# Patient Record
Sex: MALE | Race: WHITE | NOT HISPANIC OR LATINO | URBAN - METROPOLITAN AREA
[De-identification: names, ages, dates, MRNs, and addresses within clinical notes are randomized per-mention and may not be internally consistent; named-entity substitution may affect disease eponyms.]

---

## 2018-09-05 ENCOUNTER — INPATIENT (INPATIENT)
Facility: HOSPITAL | Age: 73
LOS: 4 days | Discharge: HOME | End: 2018-09-10
Attending: INTERNAL MEDICINE | Admitting: INTERNAL MEDICINE
Payer: MEDICARE

## 2018-09-05 VITALS
OXYGEN SATURATION: 100 % | SYSTOLIC BLOOD PRESSURE: 117 MMHG | TEMPERATURE: 98 F | DIASTOLIC BLOOD PRESSURE: 59 MMHG | HEART RATE: 99 BPM | WEIGHT: 192.02 LBS

## 2018-09-05 LAB
ABO RH CONFIRMATION: SIGNIFICANT CHANGE UP
ALBUMIN SERPL ELPH-MCNC: 3.6 G/DL — SIGNIFICANT CHANGE UP (ref 3.5–5.2)
ALP SERPL-CCNC: 24 U/L — LOW (ref 30–115)
ALT FLD-CCNC: 10 U/L — SIGNIFICANT CHANGE UP (ref 0–41)
ANION GAP SERPL CALC-SCNC: 14 MMOL/L — SIGNIFICANT CHANGE UP (ref 7–14)
APTT BLD: 29.1 SEC — SIGNIFICANT CHANGE UP (ref 27–39.2)
AST SERPL-CCNC: 13 U/L — SIGNIFICANT CHANGE UP (ref 0–41)
BASE EXCESS BLDV CALC-SCNC: 2.8 MMOL/L — HIGH (ref -2–2)
BASOPHILS # BLD AUTO: 0.06 K/UL — SIGNIFICANT CHANGE UP (ref 0–0.2)
BASOPHILS NFR BLD AUTO: 0.7 % — SIGNIFICANT CHANGE UP (ref 0–1)
BILIRUB SERPL-MCNC: 0.3 MG/DL — SIGNIFICANT CHANGE UP (ref 0.2–1.2)
BLD GP AB SCN SERPL QL: SIGNIFICANT CHANGE UP
BUN SERPL-MCNC: 15 MG/DL — SIGNIFICANT CHANGE UP (ref 10–20)
CA-I SERPL-SCNC: 1.15 MMOL/L — SIGNIFICANT CHANGE UP (ref 1.12–1.3)
CALCIUM SERPL-MCNC: 8.4 MG/DL — LOW (ref 8.5–10.1)
CHLORIDE SERPL-SCNC: 101 MMOL/L — SIGNIFICANT CHANGE UP (ref 98–110)
CO2 SERPL-SCNC: 24 MMOL/L — SIGNIFICANT CHANGE UP (ref 17–32)
CREAT SERPL-MCNC: 1.1 MG/DL — SIGNIFICANT CHANGE UP (ref 0.7–1.5)
EOSINOPHIL # BLD AUTO: 0.22 K/UL — SIGNIFICANT CHANGE UP (ref 0–0.7)
EOSINOPHIL NFR BLD AUTO: 2.7 % — SIGNIFICANT CHANGE UP (ref 0–8)
GAS PNL BLDV: 140 MMOL/L — SIGNIFICANT CHANGE UP (ref 136–145)
GAS PNL BLDV: SIGNIFICANT CHANGE UP
GLUCOSE SERPL-MCNC: 88 MG/DL — SIGNIFICANT CHANGE UP (ref 70–99)
HCO3 BLDV-SCNC: 28 MMOL/L — SIGNIFICANT CHANGE UP (ref 22–29)
HCT VFR BLD CALC: 19.5 % — LOW (ref 42–52)
HCT VFR BLDA CALC: 19.9 % — LOW (ref 34–44)
HGB BLD CALC-MCNC: 6.5 G/DL — LOW (ref 14–18)
HGB BLD-MCNC: 6 G/DL — CRITICAL LOW (ref 14–18)
IMM GRANULOCYTES NFR BLD AUTO: 0.2 % — SIGNIFICANT CHANGE UP (ref 0.1–0.3)
INR BLD: 1.53 RATIO — HIGH (ref 0.65–1.3)
LACTATE BLDV-MCNC: 1.2 MMOL/L — SIGNIFICANT CHANGE UP (ref 0.5–1.6)
LYMPHOCYTES # BLD AUTO: 3 K/UL — SIGNIFICANT CHANGE UP (ref 1.2–3.4)
LYMPHOCYTES # BLD AUTO: 36.8 % — SIGNIFICANT CHANGE UP (ref 20.5–51.1)
MCHC RBC-ENTMCNC: 29.6 PG — SIGNIFICANT CHANGE UP (ref 27–31)
MCHC RBC-ENTMCNC: 30.8 G/DL — LOW (ref 32–37)
MCV RBC AUTO: 96.1 FL — HIGH (ref 80–94)
MONOCYTES # BLD AUTO: 0.68 K/UL — HIGH (ref 0.1–0.6)
MONOCYTES NFR BLD AUTO: 8.3 % — SIGNIFICANT CHANGE UP (ref 1.7–9.3)
NEUTROPHILS # BLD AUTO: 4.18 K/UL — SIGNIFICANT CHANGE UP (ref 1.4–6.5)
NEUTROPHILS NFR BLD AUTO: 51.3 % — SIGNIFICANT CHANGE UP (ref 42.2–75.2)
NRBC # BLD: 0 /100 WBCS — SIGNIFICANT CHANGE UP (ref 0–0)
PCO2 BLDV: 46 MMHG — SIGNIFICANT CHANGE UP (ref 41–51)
PH BLDV: 7.39 — SIGNIFICANT CHANGE UP (ref 7.26–7.43)
PLATELET # BLD AUTO: 243 K/UL — SIGNIFICANT CHANGE UP (ref 130–400)
PO2 BLDV: 12 MMHG — LOW (ref 20–40)
POTASSIUM BLDV-SCNC: 3.4 MMOL/L — SIGNIFICANT CHANGE UP (ref 3.3–5.6)
POTASSIUM SERPL-MCNC: 3.8 MMOL/L — SIGNIFICANT CHANGE UP (ref 3.5–5)
POTASSIUM SERPL-SCNC: 3.8 MMOL/L — SIGNIFICANT CHANGE UP (ref 3.5–5)
PROT SERPL-MCNC: 5.6 G/DL — LOW (ref 6–8)
PROTHROM AB SERPL-ACNC: 16.5 SEC — HIGH (ref 9.95–12.87)
RBC # BLD: 2.03 M/UL — LOW (ref 4.7–6.1)
RBC # FLD: 14.7 % — HIGH (ref 11.5–14.5)
SAO2 % BLDV: 14 % — SIGNIFICANT CHANGE UP
SODIUM SERPL-SCNC: 139 MMOL/L — SIGNIFICANT CHANGE UP (ref 135–146)
TROPONIN T SERPL-MCNC: <0.01 NG/ML — SIGNIFICANT CHANGE UP
TYPE + AB SCN PNL BLD: SIGNIFICANT CHANGE UP
WBC # BLD: 8.16 K/UL — SIGNIFICANT CHANGE UP (ref 4.8–10.8)
WBC # FLD AUTO: 8.16 K/UL — SIGNIFICANT CHANGE UP (ref 4.8–10.8)

## 2018-09-05 RX ORDER — ACETAMINOPHEN 500 MG
650 TABLET ORAL ONCE
Qty: 0 | Refills: 0 | Status: COMPLETED | OUTPATIENT
Start: 2018-09-05 | End: 2018-09-05

## 2018-09-05 RX ORDER — PANTOPRAZOLE SODIUM 20 MG/1
80 TABLET, DELAYED RELEASE ORAL ONCE
Qty: 0 | Refills: 0 | Status: COMPLETED | OUTPATIENT
Start: 2018-09-05 | End: 2018-09-05

## 2018-09-05 RX ORDER — PANTOPRAZOLE SODIUM 20 MG/1
8 TABLET, DELAYED RELEASE ORAL
Qty: 80 | Refills: 0 | Status: DISCONTINUED | OUTPATIENT
Start: 2018-09-05 | End: 2018-09-07

## 2018-09-05 RX ADMIN — Medication 650 MILLIGRAM(S): at 19:47

## 2018-09-05 NOTE — ED ADULT NURSE NOTE - OBJECTIVE STATEMENT
Pt 74 y/o male from home c/o SOB, dizziness, pallor, and very sleepy  x few weeks. Pt newly started on xarelto. As per pt's daughter pt's skin color is not his normal skin color, and as per pt states his stools are black like.

## 2018-09-05 NOTE — ED ADULT NURSE NOTE - NSIMPLEMENTINTERV_GEN_ALL_ED
Implemented All Universal Safety Interventions:  [place] to call system. Call bell, personal items and telephone within reach. Instruct patient to call for assistance. Room bathroom lighting operational. Non-slip footwear when patient is off stretcher. Physically safe environment: no spills, clutter or unnecessary equipment. Stretcher in lowest position, wheels locked, appropriate side rails in place.

## 2018-09-05 NOTE — ED ADULT NURSE NOTE - PMH
AAA (abdominal aortic aneurysm) without rupture    CAD (coronary artery disease)    HLD (hyperlipidemia)    MI (myocardial infarction)

## 2018-09-05 NOTE — ED PROVIDER NOTE - CARE PLAN
Principal Discharge DX:	Symptomatic anemia  Secondary Diagnosis:	Shortness of breath  Secondary Diagnosis:	Dizziness

## 2018-09-05 NOTE — ED PROVIDER NOTE - ATTENDING CONTRIBUTION TO CARE
74 y/o male with h/o CAD s/p MI (no cardiac stents), AAA s/p repair with stents, HTN, HLD, LV thrombus diagnosed 6/2018 on ct for abd pain on xarelto in ER with c/o feeling dizzy/lightheaded for the past ~ 5 days with MESSINA.  + near syncope, no LOC. no ha.  no CP.  no abd pain or back pain.  pt notes having 'dark, chocolate brown' stool for the past week, but denies any renetta blood in stool or black stool.  PE - nad, nc/at, eomi, perrl, op - clear, cta b/l, no w/r/r, rrr, abd-soft, nt/nd, nabs, rectal - brown stool as in PA note, from x 4, A&O x 3, no focal neuro deficits.  -ivf, cardiac w/u, ct abd.

## 2018-09-05 NOTE — ED PROVIDER NOTE - NS ED ROS FT
Constitutional: + generalized weakness. no fever, chills  Eyes: no visual changes  Cardiovascular:+ SOB, + near syncope. no chest pain, no palpitations, no peripheral edema  Respiratory: + SOB with exertion. no cough  Gastrointestinal: no nausea, vomiting or diarrhea. no abdominal pain. + dark stool.  s/p AAA repair with stents  :  no urinary sxs, no flank pain.  Musculoskeletal: no fall or trauma. no msk pain or injury. no neck pain, no back pain, no joint pain.    Integumentary: no rash or skin changes. no edema  Neurological: + dizziness. no headache, no visual changes, no UE/LE weakness or paresthesias. no change in mental status. no neck pain or stiffness.

## 2018-09-05 NOTE — ED PROVIDER NOTE - PHYSICAL EXAMINATION
GENERAL:  well appearing, non-toxic male in no acute distress  SKIN: skin warm, pink and dry. MMM. + pallor  NECK: Neck supple. No meningismus, or JVD. Trachea midline  PULM: CTAB. Normal respiratory effort. No respiratory distress. No wheezes, stridor, rales or rhonchi. No retractions  CV: RRR, no M/R/G.   ABD: Soft, non-tender, non-distended.  rectal: brown stool, no melena, no gross blood.   MSK: FROM of all extremities.  No MSK tenderness. No edema, erythema, cyanosis. Distal pulses intact.  NEURO: A+Ox3, no sensory/motor deficits, CN II-XII intact. No speech slurring, pronator drift, facial asymmetry. Normal finger-to-nose  b/l. 5/5 strength throughout. Normal gait. Negative Romberg.  PSYCH: appropriate behavior, cooperative.

## 2018-09-05 NOTE — ED PROVIDER NOTE - MEDICAL DECISION MAKING DETAILS
Pt in ER with c/o MESSINA/near syncope. hgb 6, pt on xarelto, brown stool on rectal exam.  pt being transfused prbc's.  ct abd neg.  case d/w icu fellow, Dr. Nguyen, pt ok for floor admission for now.  vs ok, 18g iv x 2 in place.  pt admitted for further w/u on new anemia. Pt in ER with c/o MESSINA/near syncope. hgb 6, pt on xarelto for LV thrombus, brown stool on rectal exam.  pt being transfused prbc's.  ct abd neg.  case d/w icu fellow, Dr. Nguyen, pt ok for floor admission for now.  vs ok, 18g iv x 2 in place.  pt admitted for further w/u on new anemia.

## 2018-09-05 NOTE — ED PROVIDER NOTE - PROGRESS NOTE DETAILS
labs with hgb 6.0, pt reports no h/o anemia, last had labs checked ~ 5 months ago. pt t&s'd, to be transfused 2 units prbcs. CT abd neg for acute abd pathology.  pt with symptomatic anemia, hgb 6, pt on xarelto, brown stool on rectal exam.  pt being transfused prbc's.  case d/w icu fellow, Dr. Nguyen, pt ok for floor admission for now.  vs ok, 18g iv x 2 in place.  pt admitted for further w/u on new anemia.

## 2018-09-05 NOTE — ED PROVIDER NOTE - OBJECTIVE STATEMENT
72 yo male with h/o MI, AAA s/p stent, HTN, HLD, left ventricular thombos/aneusym 72 yo male with h/o MI, AAA s/p stent, HTN, HLD, left ventricular thrombose/aneurysm presents to the ED c/o exertion SOB x 1-2 weeks. Associated with dizziness, generalized weakness, near syncope episode and dark stools. No h/o anemia or GI bleed. Denies fever, chills, chest pain, abd pain, brbpr, N/V, UE/LE weakness or paresthesias.

## 2018-09-06 DIAGNOSIS — Z98.890 OTHER SPECIFIED POSTPROCEDURAL STATES: Chronic | ICD-10-CM

## 2018-09-06 DIAGNOSIS — Z90.49 ACQUIRED ABSENCE OF OTHER SPECIFIED PARTS OF DIGESTIVE TRACT: Chronic | ICD-10-CM

## 2018-09-06 LAB
ALBUMIN SERPL ELPH-MCNC: 3.9 G/DL — SIGNIFICANT CHANGE UP (ref 3.5–5.2)
ALP SERPL-CCNC: 28 U/L — LOW (ref 30–115)
ALT FLD-CCNC: 11 U/L — SIGNIFICANT CHANGE UP (ref 0–41)
ANION GAP SERPL CALC-SCNC: 15 MMOL/L — HIGH (ref 7–14)
APTT BLD: 28 SEC — SIGNIFICANT CHANGE UP (ref 27–39.2)
APTT BLD: SIGNIFICANT CHANGE UP SEC (ref 27–39.2)
AST SERPL-CCNC: 14 U/L — SIGNIFICANT CHANGE UP (ref 0–41)
BASOPHILS # BLD AUTO: 0.06 K/UL — SIGNIFICANT CHANGE UP (ref 0–0.2)
BASOPHILS NFR BLD AUTO: 0.8 % — SIGNIFICANT CHANGE UP (ref 0–1)
BILIRUB SERPL-MCNC: 0.8 MG/DL — SIGNIFICANT CHANGE UP (ref 0.2–1.2)
BUN SERPL-MCNC: 13 MG/DL — SIGNIFICANT CHANGE UP (ref 10–20)
CALCIUM SERPL-MCNC: 8.5 MG/DL — SIGNIFICANT CHANGE UP (ref 8.5–10.1)
CHLORIDE SERPL-SCNC: 101 MMOL/L — SIGNIFICANT CHANGE UP (ref 98–110)
CK MB CFR SERPL CALC: 2.6 NG/ML — SIGNIFICANT CHANGE UP (ref 0.6–6.3)
CK SERPL-CCNC: 126 U/L — SIGNIFICANT CHANGE UP (ref 0–225)
CO2 SERPL-SCNC: 24 MMOL/L — SIGNIFICANT CHANGE UP (ref 17–32)
CREAT SERPL-MCNC: 1 MG/DL — SIGNIFICANT CHANGE UP (ref 0.7–1.5)
EOSINOPHIL # BLD AUTO: 0.19 K/UL — SIGNIFICANT CHANGE UP (ref 0–0.7)
EOSINOPHIL NFR BLD AUTO: 2.6 % — SIGNIFICANT CHANGE UP (ref 0–8)
GLUCOSE SERPL-MCNC: 102 MG/DL — HIGH (ref 70–99)
HCT VFR BLD CALC: 27.2 % — LOW (ref 42–52)
HCT VFR BLD CALC: 27.5 % — LOW (ref 42–52)
HGB BLD-MCNC: 8.7 G/DL — LOW (ref 14–18)
HGB BLD-MCNC: 8.8 G/DL — LOW (ref 14–18)
IMM GRANULOCYTES NFR BLD AUTO: 0.3 % — SIGNIFICANT CHANGE UP (ref 0.1–0.3)
INR BLD: 1.17 RATIO — SIGNIFICANT CHANGE UP (ref 0.65–1.3)
IRON SATN MFR SERPL: 14 % — LOW (ref 15–50)
IRON SATN MFR SERPL: 45 UG/DL — SIGNIFICANT CHANGE UP (ref 35–150)
LDH SERPL L TO P-CCNC: 194 U/L — SIGNIFICANT CHANGE UP (ref 50–242)
LYMPHOCYTES # BLD AUTO: 1.83 K/UL — SIGNIFICANT CHANGE UP (ref 1.2–3.4)
LYMPHOCYTES # BLD AUTO: 24.8 % — SIGNIFICANT CHANGE UP (ref 20.5–51.1)
MAGNESIUM SERPL-MCNC: 2.2 MG/DL — SIGNIFICANT CHANGE UP (ref 1.8–2.4)
MCHC RBC-ENTMCNC: 29.3 PG — SIGNIFICANT CHANGE UP (ref 27–31)
MCHC RBC-ENTMCNC: 29.3 PG — SIGNIFICANT CHANGE UP (ref 27–31)
MCHC RBC-ENTMCNC: 32 G/DL — SIGNIFICANT CHANGE UP (ref 32–37)
MCHC RBC-ENTMCNC: 32 G/DL — SIGNIFICANT CHANGE UP (ref 32–37)
MCV RBC AUTO: 91.6 FL — SIGNIFICANT CHANGE UP (ref 80–94)
MCV RBC AUTO: 91.7 FL — SIGNIFICANT CHANGE UP (ref 80–94)
MONOCYTES # BLD AUTO: 0.59 K/UL — SIGNIFICANT CHANGE UP (ref 0.1–0.6)
MONOCYTES NFR BLD AUTO: 8 % — SIGNIFICANT CHANGE UP (ref 1.7–9.3)
NEUTROPHILS # BLD AUTO: 4.69 K/UL — SIGNIFICANT CHANGE UP (ref 1.4–6.5)
NEUTROPHILS NFR BLD AUTO: 63.5 % — SIGNIFICANT CHANGE UP (ref 42.2–75.2)
NRBC # BLD: 0 /100 WBCS — SIGNIFICANT CHANGE UP (ref 0–0)
PLATELET # BLD AUTO: 226 K/UL — SIGNIFICANT CHANGE UP (ref 130–400)
PLATELET # BLD AUTO: 228 K/UL — SIGNIFICANT CHANGE UP (ref 130–400)
POTASSIUM SERPL-MCNC: 3.8 MMOL/L — SIGNIFICANT CHANGE UP (ref 3.5–5)
POTASSIUM SERPL-SCNC: 3.8 MMOL/L — SIGNIFICANT CHANGE UP (ref 3.5–5)
PROT SERPL-MCNC: 6.1 G/DL — SIGNIFICANT CHANGE UP (ref 6–8)
PROTHROM AB SERPL-ACNC: 12.6 SEC — SIGNIFICANT CHANGE UP (ref 9.95–12.87)
RBC # BLD: 2.97 M/UL — LOW (ref 4.7–6.1)
RBC # BLD: 3 M/UL — LOW (ref 4.7–6.1)
RBC # BLD: 3 M/UL — LOW (ref 4.7–6.1)
RBC # FLD: 15.1 % — HIGH (ref 11.5–14.5)
RBC # FLD: 15.2 % — HIGH (ref 11.5–14.5)
RETICS #: 142.2 K/UL — HIGH (ref 25–125)
RETICS/RBC NFR: 4.7 % — HIGH (ref 0.5–1.5)
SODIUM SERPL-SCNC: 140 MMOL/L — SIGNIFICANT CHANGE UP (ref 135–146)
TIBC SERPL-MCNC: 326 UG/DL — SIGNIFICANT CHANGE UP (ref 220–430)
TROPONIN T SERPL-MCNC: <0.01 NG/ML — SIGNIFICANT CHANGE UP
UIBC SERPL-MCNC: 281 UG/DL — SIGNIFICANT CHANGE UP (ref 110–370)
WBC # BLD: 10.86 K/UL — HIGH (ref 4.8–10.8)
WBC # BLD: 7.38 K/UL — SIGNIFICANT CHANGE UP (ref 4.8–10.8)
WBC # FLD AUTO: 10.86 K/UL — HIGH (ref 4.8–10.8)
WBC # FLD AUTO: 7.38 K/UL — SIGNIFICANT CHANGE UP (ref 4.8–10.8)

## 2018-09-06 PROCEDURE — 99222 1ST HOSP IP/OBS MODERATE 55: CPT

## 2018-09-06 RX ORDER — ACETAMINOPHEN 500 MG
650 TABLET ORAL EVERY 4 HOURS
Qty: 0 | Refills: 0 | Status: DISCONTINUED | OUTPATIENT
Start: 2018-09-06 | End: 2018-09-10

## 2018-09-06 RX ORDER — HEPARIN SODIUM 5000 [USP'U]/ML
1600 INJECTION INTRAVENOUS; SUBCUTANEOUS
Qty: 25000 | Refills: 0 | Status: DISCONTINUED | OUTPATIENT
Start: 2018-09-06 | End: 2018-09-07

## 2018-09-06 RX ORDER — FUROSEMIDE 40 MG
20 TABLET ORAL DAILY
Qty: 0 | Refills: 0 | Status: DISCONTINUED | OUTPATIENT
Start: 2018-09-06 | End: 2018-09-10

## 2018-09-06 RX ORDER — METOPROLOL TARTRATE 50 MG
100 TABLET ORAL DAILY
Qty: 0 | Refills: 0 | Status: DISCONTINUED | OUTPATIENT
Start: 2018-09-06 | End: 2018-09-10

## 2018-09-06 RX ORDER — HEPARIN SODIUM 5000 [USP'U]/ML
7000 INJECTION INTRAVENOUS; SUBCUTANEOUS ONCE
Qty: 0 | Refills: 0 | Status: COMPLETED | OUTPATIENT
Start: 2018-09-06 | End: 2018-09-06

## 2018-09-06 RX ORDER — ATORVASTATIN CALCIUM 80 MG/1
80 TABLET, FILM COATED ORAL AT BEDTIME
Qty: 0 | Refills: 0 | Status: DISCONTINUED | OUTPATIENT
Start: 2018-09-06 | End: 2018-09-10

## 2018-09-06 RX ORDER — SOD SULF/SODIUM/NAHCO3/KCL/PEG
4000 SOLUTION, RECONSTITUTED, ORAL ORAL ONCE
Qty: 0 | Refills: 0 | Status: COMPLETED | OUTPATIENT
Start: 2018-09-06 | End: 2018-09-06

## 2018-09-06 RX ADMIN — ATORVASTATIN CALCIUM 80 MILLIGRAM(S): 80 TABLET, FILM COATED ORAL at 21:38

## 2018-09-06 RX ADMIN — HEPARIN SODIUM 16 UNIT(S)/HR: 5000 INJECTION INTRAVENOUS; SUBCUTANEOUS at 18:18

## 2018-09-06 RX ADMIN — Medication 4000 MILLILITER(S): at 18:19

## 2018-09-06 RX ADMIN — Medication 20 MILLIGRAM(S): at 06:26

## 2018-09-06 RX ADMIN — Medication 650 MILLIGRAM(S): at 21:30

## 2018-09-06 RX ADMIN — PANTOPRAZOLE SODIUM 10 MG/HR: 20 TABLET, DELAYED RELEASE ORAL at 17:37

## 2018-09-06 RX ADMIN — Medication 15 MILLIGRAM(S): at 21:39

## 2018-09-06 RX ADMIN — Medication 100 MILLIGRAM(S): at 06:26

## 2018-09-06 RX ADMIN — PANTOPRAZOLE SODIUM 10 MG/HR: 20 TABLET, DELAYED RELEASE ORAL at 06:56

## 2018-09-06 RX ADMIN — Medication 10 MILLIGRAM(S): at 18:17

## 2018-09-06 RX ADMIN — HEPARIN SODIUM 7000 UNIT(S): 5000 INJECTION INTRAVENOUS; SUBCUTANEOUS at 18:17

## 2018-09-06 RX ADMIN — PANTOPRAZOLE SODIUM 80 MILLIGRAM(S): 20 TABLET, DELAYED RELEASE ORAL at 03:00

## 2018-09-06 NOTE — H&P ADULT - NSHPSOCIALHISTORY_GEN_ALL_CORE
Marital Status:  ( x  )    (   ) Single    (   )    (  )   Occupation: Lives with: (  ) alone  (  ) children   ( x ) spouse   (  ) parents  (  ) other  Recent Travel: None    Substance Use (street drugs): ( x ) never used  (  ) other:  Tobacco Usage:  (   ) never smoked   (   ) former smoker   ( x  ) current smoker : 1/2 ppd for >50 years   Alcohol Usage: Denied

## 2018-09-06 NOTE — CONSULT NOTE ADULT - ASSESSMENT
73M with pmhx of AAA s/p EVAR 2011 (Dr. Leblanc [place]), CAD/MI, Left ventricular thrombus/aneurysm (on Xarelto), HTN, sciatica, and HLD presents for 4 days of progressive dynspnea on exertion, pre syncope, and dizziness. Patient does admit to weight loss of 3-4 lbs in the last 3-4 months due slight dietary modification. Patient denied melena, hematochezia, hematuria, hematemesis, recent trauma, night sweats/pain, history of cancer, abdominal pain, n/v/d, rashes, or loss of consciousness. Stools have been reported as dark brown, not typically different from his usual bowel movements. No recent illness or abx usage. Patient also denied any history of anemia in the past.   Reported frequent daily NSAID usage for sciatic pain but stopped taking them ~1.5 months ago.   Patients last colo was in 2009 and showed 5mm non-malignant polyp on pathology.   Xarelto was started ~3 weeks ago by  Cardiologist. States he has tolerated it well. Previously was taking plavix.     Last seen by Dr. Leblanc 2 wks ago. No endoleak on CTA    Vascular called for clearance prior endoscopy/colonoscopy    Will d/w Dr. Ewing

## 2018-09-06 NOTE — H&P ADULT - NSHPOUTPATIENTPROVIDERS_GEN_ALL_CORE
PMD: Dr Sequeira  [facility] Sx: Dr. Herrera ([place])  GI: Dr. Che   Cardio: Dr. Urbina     Pharm: [facility] in [place] PMD: Dr Fabby Crews Sx: Dr. Herrera ([place])  GI: Dr. Che   Cardio: Dr. Cruz     Pharm: [facility] in [place]

## 2018-09-06 NOTE — CONSULT NOTE ADULT - ASSESSMENT
A 73M with pmhx of AAA s/p stent, CAD/MI, Left ventricular thrombus/aneurysm (on Xarelto started 3 weeks ago and plavix was stopped atthattime), HTN, sciatica, and HLD presents for 4 days of progressive dyspnea on exertion, pre syncope, and dizziness. Patient reports having dark brown stool last week .He denies any hx of GI bleed in the past, Hematochezia, hematemesis, abdominal pain, dysphagia, odynophagia.Patient does admit to weight loss of 3-4 lbs in the last 3-4 months due slight dietary modification. he also  reported frequent daily NSAID usage for sciatic pain but stopped taking them ~1.5 months ago.   Last Colonoscopy in 2009 by Dr renee narayanan showed polyp and never had an EGD       #Symptomatic anemia/No signs of active GI bleeding( Patient may have upper GI  bleed before then now resolved)  No baseline Hg , pt s/p 2 u PRBC in ED with last hg 8.8 from 6   2 iv 18 G  can have clear liquid, if repeat hg stable can advance diet    Protonix drip   Follow Hg q12h  Icu evaluation   Patient is on xarelto and needs to be off for 2 days before any intervention is done  Please get cardiology clearance to be off ac and whether he needs bridging giving patient had recent LV thrombus  Consider 2 d echo   Patient will need EGD/ colonoscopy  will follow A 73M with pmhx of AAA s/p stent, CAD/MI, Left ventricular thrombus/aneurysm (on Xarelto started 3 weeks ago and plavix was stopped atthattime), HTN, sciatica, and HLD presents for 4 days of progressive dyspnea on exertion, pre syncope, and dizziness. Patient reports having dark brown stool last week .He denies any hx of GI bleed in the past, Hematochezia, hematemesis, abdominal pain, dysphagia, odynophagia.Patient does admit to weight loss of 3-4 lbs in the last 3-4 months due slight dietary modification. he also  reported frequent daily NSAID usage for sciatic pain but stopped taking them ~1.5 months ago.   Last Colonoscopy in 2009 by Dr renee narayanan showed polyp and never had an EGD       #Symptomatic anemia/No signs of active GI bleeding( Patient may have upper GI  bleed before then now resolved)  No baseline Hg , pt s/p 2 u PRBC in ED with last hg 8.8 from 6   2 iv 18 G  can have clear liquid, if repeat hg stable can advance diet    Protonix drip   Follow Hg q12h  Icu evaluation   Patient is on xarelto and needs to be off for 2 days before any intervention is done  Will do  EGD/ colonoscopy tomorrow  Please start heparin drip tonight at 6hpm and stop the drip tomorrow at 4ham   Please give golytely and dulcolax   NPO after midnight   Resident on call informed A 73M with pmhx of AAA s/p stent, CAD/MI, Left ventricular thrombus/aneurysm (on Xarelto started 3 weeks ago and plavix was stopped atthattime), HTN, sciatica, and HLD presents for 4 days of progressive dyspnea on exertion, pre syncope, and dizziness. Patient reports having dark brown stool last week .He denies any hx of GI bleed in the past, Hematochezia, hematemesis, abdominal pain, dysphagia, odynophagia.Patient does admit to weight loss of 3-4 lbs in the last 3-4 months due slight dietary modification. he also  reported frequent daily NSAID usage for sciatic pain but stopped taking them ~1.5 months ago.   Last Colonoscopy in 2009 by Dr renee narayanan showed polyp and never had an EGD       #Symptomatic anemia/No signs of active GI bleeding( Patient may have upper GI  bleed before then now resolved)  No baseline Hg , pt s/p 2 u PRBC in ED with last hg 8.8 from 6   2 iv 18 G  can have clear liquid, if repeat hg stable can advance diet    Protonix drip   Follow Hg q12h  Icu evaluation   Patient is on xarelto and needs to be off for 2 days before any intervention is done  Will do  EGD/ colonoscopy tomorrow  Please start heparin drip tonight at 6hpm and stop the drip tomorrow at 4 am   Please give golytely and dulcolax   NPO after midnight   Resident on call informed

## 2018-09-06 NOTE — H&P ADULT - HISTORY OF PRESENT ILLNESS
73M with pmhx of AAA s/p stent, CAD/MI, Left ventricular thrombus/aneurysm (on Xarelto), HTN, sciatica, and HLD presents for 4 days of progressive dynspnea on exertion, pre syncope, and dizziness. Patient does admit to weight loss of 3-4 lbs in the last 3-4 months due slight dietary modification. Patient denied melena, hematochezia, hematuria, hematemesis, recent trauma, night sweats/pain, history of cancer, abdominal pain, n/v/d, rashes, or loss of consciousness. Stools have been reported as dark brown, not typically different from his usual bowel movements. No recent illness or abx usage. Patient also denied any history of anemia in the past.   Reported frequent daily NSAID usage for sciatic pain but stopped taking them ~1.5 months ago.   Patients last colo was in 2009 and showed 5mm non-malignant polyp on pathology.   Xarelto was started ~3 weeks ago by  Cardiologist. States he has tolerated it well. Previously was taking plavix.       In ED: T: 99   HR: 99    BP: 117/59   Hb was found to be 6 (last CBC in 2009 was Hb 14.9), transfused 2 u prbc. Stool was found to be dark brown on exam in ED (no guaiac done)

## 2018-09-06 NOTE — H&P ADULT - NSHPPHYSICALEXAM_GEN_ALL_CORE
Vital Signs Last 24 Hrs  T(C): 36.4 (05 Sep 2018 21:50), Max: 36.7 (05 Sep 2018 14:00)  T(F): 97.6 (05 Sep 2018 21:50), Max: 98 (05 Sep 2018 14:00)  HR: 71 (05 Sep 2018 21:50) (71 - 99)  BP: 97/58 (05 Sep 2018 21:50) (92/56 - 120/70  RR: 18 (05 Sep 2018 21:50) (18 - 18)  SpO2: 100% (05 Sep 2018 21:50) (100% - 100%)      GENERAL: NAD, well-developed, Non-toxic, stated age   HEAD:  Atraumatic, Normocephalic  EYES: EOMI, conjunctiva and sclera clear  NECK: Supple, No JVD  CHEST/LUNG: Clear to auscultation bilaterally; No wheezing, rhonchi, or crackles  HEART: Regular rate and rhythm; s1, s2, No murmurs, rubs, or gallops  ABDOMEN: Soft, Nontender, Nondistended; Bowel sounds present, No rebound or guarding noted   EXTREMITIES:  No lower extremity edema or calf tenderness to palpation.  No clubbing or cyanosis  PSYCH: AAOx3  NEUROLOGY: non-focal  SKIN: No rashes or lesions

## 2018-09-06 NOTE — CONSULT NOTE ADULT - SUBJECTIVE AND OBJECTIVE BOX
We were asked to see the patient for symptomatic anemia   GI HPI:  A 73M with pmhx of AAA s/p stent, CAD/MI, Left ventricular thrombus/aneurysm (on Xarelto started 3 weeks ago and plavix was stopped atthattime), HTN, sciatica, and HLD presents for 4 days of progressive dyspnea on exertion, pre syncope, and dizziness. Patient reports having dark brown stool last week .He denies any hx of GI bleed in the past, Hematochezia, hematemesis, abdominal pain, dysphagia, odynophagia.Patient does admit to weight loss of 3-4 lbs in the last 3-4 months due slight dietary modification. he also  reported frequent daily NSAID usage for sciatic pain but stopped taking them ~1.5 months ago.   Last Colonoscopy in 2009 by Dr renee narayanan showed polyp and never had an EGD       In ED: T: 99   HR: 99    BP: 117/59   Hb was found to be 6 (last CBC in 2009 was Hb 14.9), transfused 2 u prbc. Stool was found to be dark brown on exam in ED (no guaiac done) (06 Sep 2018 01:41)      PAST MEDICAL & SURGICAL HISTORY  Sciatica  HLD (hyperlipidemia)  CAD (coronary artery disease)  AAA (abdominal aortic aneurysm) without rupture  MI (myocardial infarction)  History of appendectomy  History of abdominal aortic aneurysm (AAA) repair      FAMILY HISTORY:  FAMILY HISTORY:  Family history of oral cancer (Father)      SOCIAL HISTORY:  smoker: + smoker  Alcohol: Non alcoholic  Drug: Denies use of drugs     ALLERGIES:  No Known Allergies      MEDICATIONS:  MEDICATIONS  (STANDING):  atorvastatin 80 milliGRAM(s) Oral at bedtime  furosemide    Tablet 20 milliGRAM(s) Oral daily  metoprolol succinate  milliGRAM(s) Oral daily  pantoprazole Infusion 8 mG/Hr (10 mL/Hr) IV Continuous <Continuous>    MEDICATIONS  (PRN):  acetaminophen   Tablet .. 650 milliGRAM(s) Oral every 4 hours PRN Temp greater or equal to 38C (100.4F), Mild Pain (1 - 3)      HOME MEDICATIONS:  Home Medications:  Crestor 40 mg oral tablet: 1 tab(s) orally once a day (at bedtime) (06 Sep 2018 01:53)  famotidine 20 mg oral tablet: 1 tab(s) orally (06 Sep 2018 01:53)  furosemide 20 mg oral tablet: 1 tab(s) orally once a day (06 Sep 2018 01:53)  metoprolol: 120 milligram(s) orally (06 Sep 2018 01:53)  Xarelto 20 mg oral tablet: 1 tab(s) orally once a day (in the evening) (06 Sep 2018 01:53)      ROS:     REVIEW OF SYSTEMS  General:  + weight loss, no fevers, or chills.  Eyes:  No reported pain or visual changes  ENT:  No sore throat or runny nose.  NECK: No stiffness or lymphadenopathy  CV:  No chest pain or palpitations.  GI:  See HPI  :  No dysuria, urinary incontinence or hematuria.  Muscle:  No aches or weakness  Neuro:  No tingling, numbness or vertigo  Psych:  No mood problems or irritability.  Endocrine:  No polyuria, polydipsia or cold/heat intolerance  Heme:  No ecchymosis or easy bruisability  All other review of systems is negative unless indicated above.         VITALS:   T(F): 96.8 (09-06 @ 07:38), Max: 98 (09-05 @ 14:00)  HR: 74 (09-06 @ 07:38) (70 - 99)  BP: 128/63 (09-06 @ 07:38) (92/56 - 128/63)  BP(mean): --  RR: 20 (09-06 @ 07:38) (18 - 20)  SpO2: 100% (09-06 @ 07:38) (100% - 100%)    I&O's Summary      PHYSICAL EXAM:  GENERAL:  Appears stated age,  no distress  HEENT:  Conjunctivae anicteric  CHEST:  Full & symmetric excursion, no increased effort, breath sounds clear  HEART:  Regular rhythm, S1, S2,   ABDOMEN:  Soft, non-tender, non-distended, normoactive bowel sounds,  no masses ,  EXTEREMITIES:  no  edema  SKIN:  No rash  NEURO:  Alert, oriented, , no tremor,         LABS:                        8.8    7.38  )-----------( 228      ( 06 Sep 2018 09:36 )             27.5     PT/INR - ( 05 Sep 2018 17:14 )  INR: 1.53          PTT - ( 05 Sep 2018 17:14 )  PTT:29.1   LIVER FUNCTIONS - ( 05 Sep 2018 17:14 )  Alb: 3.6 g/dL / Pro: 5.6 g/dL / ALK PHOS: 24 U/L / ALT: 10 U/L / AST: 13 U/L / GGT: x           09-05    139  |  101  |  15  ----------------------------<  88  3.8   |  24  |  1.1    Ca    8.4<L>      05 Sep 2018 17:14      CARDIAC MARKERS ( 05 Sep 2018 17:14 )  x     / <0.01 ng/mL / x     / x     / x            RADIOLOGY:    < from: CT Abdomen and Pelvis w/ IV Cont (09.05.18 @ 21:28) >  NTERPRETATION:  CLINICAL STATEMENT: Abdominal pain.      TECHNIQUE: Contiguous axial CT images were obtained from the lower chest   to the pubic symphysis following administration of 100cc Optiray 320   intravenous contrast.  Oral contrast was not administered.  Reformatted   images in the coronal and sagittal planes were acquired.    COMPARISON CT: None.    OTHER STUDIES USED FOR CORRELATION: None.       FINDINGS:    LOWER CHEST: Unremarkable.    HEPATOBILIARY: Unremarkable.    SPLEEN: Unremarkable.    PANCREAS: Unremarkable.    ADRENAL GLANDS: Unremarkable.    KIDNEYS: Right renal scarring. Right renal cyst. No hydronephrosis    ABDOMINOPELVIC NODES: Unremarkable.    PELVIC ORGANS: Unremarkable.    PERITONEUM/MESENTERY/BOWEL: Unremarkable.    BONES/SOFT TISSUES: Degenerative changes spine.    OTHER: Infrarenal abdominal 4 cm aortic aneurysm, status post   aortobiiliac stent with SMA and bilateral renal artery stenting. Small   right fat-containing inguinal hernia.      IMPRESSION:   1. No evidence of acute intra-abdominal pathology.  2. Infrarenal abdominal 4 cm aortic aneurysm, status post aortobiiliac   stent with SMA and bilateral renal artery stenting.                    < end of copied text >  < from: Xray Chest 1 View- PORTABLE-Urgent (09.05.18 @ 18:53) >      PROCEDURE DATE:  09/05/2018            INTERPRETATION:  Clinical History / Reason for exam: Shortness of breath    Comparison : Chest radiograph December 17, 2009.    Technique/Positioning: Frontal, portable.    Findings:    Support devices: None.    Cardiac/mediastinum/hilum: Heart is prominent. Aorta is calcified.    Lung parenchyma/Pleura: Within normal limits.    Skeleton/soft tissues: Degenerative change    Impression:      No radiographic evidence of acute cardiopulmonary disease.                < end of copied text >

## 2018-09-06 NOTE — CONSULT NOTE ADULT - SUBJECTIVE AND OBJECTIVE BOX
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 Patient does admit to weight loss of 3-4 lbs in the last 3-4 months due slight dietary modification. Patient denied melena, hematochezia, hematuria, hematemesis, recent trauma, night sweats/pain, history of cancer, abdominal pain, n/v/d, rashes, or loss of consciousness. Stools have been reported as dark brown, not typically different from his usual bowel movements. No recent illness or abx usage. Patient also denied any history of anemia in the past.   Reported frequent daily NSAID usage for sciatic pain but stopped taking them ~1.5 months ago.   Patients last colo was in 2009 and showed 5mm non-malignant polyp on pathology.   Xarelto was started ~3 weeks ago by  Cardiologist. States he has tolerated it well. Previously was taking plavix.       In ED: T: 99   HR: 99    BP: 117/59   Hb was found to be 6 (last CBC in 2009 was Hb 14.9), transfused 2 u prbc. Stool was found to be dark brown on exam in ED (no guaiac done) (06 Sep 2018 01:41)      PAST MEDICAL & SURGICAL HISTORY:  Sciatica  HLD (hyperlipidemia)  CAD (coronary artery disease)  AAA (abdominal aortic aneurysm) without rupture  MI (myocardial infarction)  History of appendectomy  History of abdominal aortic aneurysm (AAA) repair      SOCIAL HX:   Smoking                         ETOH                            Other    FAMILY HISTORY:  Family history of oral cancer (Father)  :  No known cardiovacular family hisotry     ROS:  See HPI     Allergies    No Known Allergies    Intolerances          PHYSICAL EXAM    ICU Vital Signs Last 24 Hrs  T(C): 36.1 (06 Sep 2018 06:38), Max: 36.7 (05 Sep 2018 14:00)  T(F): 97 (06 Sep 2018 06:38), Max: 98 (05 Sep 2018 14:00)  HR: 70 (06 Sep 2018 06:38) (70 - 99)  BP: 109/56 (06 Sep 2018 06:38) (92/56 - 120/70)  BP(mean): --  ABP: --  ABP(mean): --  RR: 20 (06 Sep 2018 06:38) (18 - 20)  SpO2: 100% (05 Sep 2018 21:50) (100% - 100%)      General: In NAD   HEENT:  JASIEL              Lymphatic system: No cervical LN   Lungs: Bilateral BS  Cardiovascular: Regular  Gastrointestinal: Soft, Positive BS  Musculoskeletal: No clubbing.  Moves all extremities.  Full range of motion   Skin: Warm.  Intact  Neurological: No motor or sensory deficit         LABS:                          6.0    8.16  )-----------( 243      ( 05 Sep 2018 17:14 )             19.5                                               09-05    139  |  101  |  15  ----------------------------<  88  3.8   |  24  |  1.1    Ca    8.4<L>      05 Sep 2018 17:14    TPro  5.6<L>  /  Alb  3.6  /  TBili  0.3  /  DBili  x   /  AST  13  /  ALT  10  /  AlkPhos  24<L>  09-05      PT/INR - ( 05 Sep 2018 17:14 )   PT: 16.50 sec;   INR: 1.53 ratio         PTT - ( 05 Sep 2018 17:14 )  PTT:29.1 sec                                           CARDIAC MARKERS ( 05 Sep 2018 17:14 )  x     / <0.01 ng/mL / x     / x     / x                                                LIVER FUNCTIONS - ( 05 Sep 2018 17:14 )  Alb: 3.6 g/dL / Pro: 5.6 g/dL / ALK PHOS: 24 U/L / ALT: 10 U/L / AST: 13 U/L / GGT: x                                                                                             < from: CT Abdomen and Pelvis w/ IV Cont (09.05.18 @ 21:28) >  IMPRESSION:   1. No evidence of acute intra-abdominal pathology.  2. Infrarenal abdominal 4 cm aortic aneurysm, status post aortobiiliac   stent with SMA and bilateral renal artery stenting.    < end of copied text >                                          X-Rays                 unremarkable                                                              MEDICATIONS  (STANDING):  atorvastatin 80 milliGRAM(s) Oral at bedtime  furosemide    Tablet 20 milliGRAM(s) Oral daily  metoprolol succinate  milliGRAM(s) Oral daily  pantoprazole Infusion 8 mG/Hr (10 mL/Hr) IV Continuous <Continuous>      acetaminophen   Tablet .. 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 Patient does admit to weight loss of 3-4 lbs in the last 3-4 months due slight dietary modification. Patient denied melena, hematochezia, hematuria, hematemesis, recent trauma, night sweats/pain, history of cancer, abdominal pain, n/v/d, rashes, or loss of consciousness. Stools have been reported as dark brown, not typically different from his usual bowel movements. No recent illness or abx usage. Patient also denied any history of anemia in the past.   Reported frequent daily NSAID usage for sciatic pain but stopped taking them ~1.5 months ago.   Patients last colo was in 2009 and showed 5mm non-malignant polyp on pathology.   Xarelto was started ~3 weeks ago by  Cardiologist. States he has tolerated it well. Previously was taking plavix.       In ED: T: 99   HR: 99    BP: 117/59   Hb was found to be 6 (last CBC in 2009 was Hb 14.9), transfused 2 u prbc. Stool was found to be dark brown on exam in ED (no guaiac done) (06 Sep 2018 01:41)      PAST MEDICAL & SURGICAL HISTORY:  Sciatica  HLD (hyperlipidemia)  CAD (coronary artery disease)  AAA (abdominal aortic aneurysm) without rupture  MI (myocardial infarction)  History of appendectomy  History of abdominal aortic aneurysm (AAA) repair      SOCIAL HX:   50 pack years ETOH                       FAMILY HISTORY:  Family history of oral cancer (Father)  :  No known cardiovacular family hisotry     ROS:  See HPI     Allergies    No Known Allergies    Intolerances          PHYSICAL EXAM    ICU Vital Signs Last 24 Hrs  T(C): 36.1 (06 Sep 2018 06:38), Max: 36.7 (05 Sep 2018 14:00)  T(F): 97 (06 Sep 2018 06:38), Max: 98 (05 Sep 2018 14:00)  HR: 70 (06 Sep 2018 06:38) (70 - 99)  BP: 109/56 (06 Sep 2018 06:38) (92/56 - 120/70)  BP(mean): --  ABP: --  ABP(mean): --  RR: 20 (06 Sep 2018 06:38) (18 - 20)  SpO2: 100% (05 Sep 2018 21:50) (100% - 100%)      General: In NAD   HEENT:  JASIEL              Lymphatic system: No cervical LN   Lungs: Bilateral BS  Cardiovascular: Regular  Gastrointestinal: Soft, Positive BS  Musculoskeletal: No clubbing.  Moves all extremities.  Full range of motion   Skin: Warm.  Intact  Neurological: No motor or sensory deficit         LABS:                          6.0    8.16  )-----------( 243      ( 05 Sep 2018 17:14 )             19.5                                               09-05    139  |  101  |  15  ----------------------------<  88  3.8   |  24  |  1.1    Ca    8.4<L>      05 Sep 2018 17:14    TPro  5.6<L>  /  Alb  3.6  /  TBili  0.3  /  DBili  x   /  AST  13  /  ALT  10  /  AlkPhos  24<L>  09-05      PT/INR - ( 05 Sep 2018 17:14 )   PT: 16.50 sec;   INR: 1.53 ratio         PTT - ( 05 Sep 2018 17:14 )  PTT:29.1 sec                                           CARDIAC MARKERS ( 05 Sep 2018 17:14 )  x     / <0.01 ng/mL / x     / x     / x                                                LIVER FUNCTIONS - ( 05 Sep 2018 17:14 )  Alb: 3.6 g/dL / Pro: 5.6 g/dL / ALK PHOS: 24 U/L / ALT: 10 U/L / AST: 13 U/L / GGT: x                                                                                             < from: CT Abdomen and Pelvis w/ IV Cont (09.05.18 @ 21:28) >  IMPRESSION:   1. No evidence of acute intra-abdominal pathology.  2. Infrarenal abdominal 4 cm aortic aneurysm, status post aortobiiliac   stent with SMA and bilateral renal artery stenting.    < end of copied text >                                          X-Rays                 unremarkable                                                              MEDICATIONS  (STANDING):  atorvastatin 80 milliGRAM(s) Oral at bedtime  furosemide    Tablet 20 milliGRAM(s) Oral daily  metoprolol succinate  milliGRAM(s) Oral daily  pantoprazole Infusion 8 mG/Hr (10 mL/Hr) IV Continuous <Continuous>      acetaminophen   Tablet .. 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 Patient does admit to weight loss of 3-4 lbs in the last 3-4 months due slight dietary modification. Patient denied melena, hematochezia, hematuria, hematemesis, recent trauma, night sweats/pain, history of cancer, abdominal pain, n/v/d, rashes, or loss of consciousness. Stools have been reported as dark brown, not typically different from his usual bowel movements. No recent illness or abx usage. Patient also denied any history of anemia in the past.   Reported frequent daily NSAID usage for sciatic pain but stopped taking them ~1.5 months ago.   Patients last colo was in 2009 and showed 5mm non-malignant polyp on pathology.   Xarelto was started ~3 weeks ago by  Cardiologist. States he has tolerated it well. Previously was taking plavix.       In ED: T: 99   HR: 99    BP: 117/59   Hb was found to be 6 (last CBC in 2009 was Hb 14.9), transfused 2 u prbc. Stool was found to be dark brown on exam in ED (no guaiac done) (06 Sep 2018 01:41), feels better, no BM in ED      PAST MEDICAL & SURGICAL HISTORY:  Sciatica  HLD (hyperlipidemia)  CAD (coronary artery disease)  AAA (abdominal aortic aneurysm) without rupture  MI (myocardial infarction)  History of appendectomy  History of abdominal aortic aneurysm (AAA) repair      SOCIAL HX:   50 pack years ETOH                       FAMILY HISTORY:  Family history of oral cancer (Father)  :  No known cardiovacular family hisotry     ROS:  See HPI     Allergies    No Known Allergies    Intolerances          PHYSICAL EXAM    ICU Vital Signs Last 24 Hrs  T(C): 36.1 (06 Sep 2018 06:38), Max: 36.7 (05 Sep 2018 14:00)  T(F): 97 (06 Sep 2018 06:38), Max: 98 (05 Sep 2018 14:00)  HR: 70 (06 Sep 2018 06:38) (70 - 99)  BP: 109/56 (06 Sep 2018 06:38) (92/56 - 120/70)  RR: 20 (06 Sep 2018 06:38) (18 - 20)  SpO2: 100% (05 Sep 2018 21:50) (100% - 100%)      General: In NAD   HEENT:  JASIEL              Lymphatic system: No cervical LN   Lungs: Bilateral BS  Cardiovascular: Regular  Gastrointestinal: Soft, Positive BS  Musculoskeletal: No clubbing.  Moves all extremities.  Full range of motion   Skin: Warm.  Intact  Neurological: No motor or sensory deficit         LABS:                          6.0    8.16  )-----------( 243      ( 05 Sep 2018 17:14 )             19.5                                               09-05    139  |  101  |  15  ----------------------------<  88  3.8   |  24  |  1.1    Ca    8.4<L>      05 Sep 2018 17:14    TPro  5.6<L>  /  Alb  3.6  /  TBili  0.3  /  DBili  x   /  AST  13  /  ALT  10  /  AlkPhos  24<L>  09-05      PT/INR - ( 05 Sep 2018 17:14 )   PT: 16.50 sec;   INR: 1.53 ratio         PTT - ( 05 Sep 2018 17:14 )  PTT:29.1 sec                                           CARDIAC MARKERS ( 05 Sep 2018 17:14 )  x     / <0.01 ng/mL / x     / x     / x                                                LIVER FUNCTIONS - ( 05 Sep 2018 17:14 )  Alb: 3.6 g/dL / Pro: 5.6 g/dL / ALK PHOS: 24 U/L / ALT: 10 U/L / AST: 13 U/L / GGT: x                                                                                             < from: CT Abdomen and Pelvis w/ IV Cont (09.05.18 @ 21:28) >  IMPRESSION:   1. No evidence of acute intra-abdominal pathology.  2. Infrarenal abdominal 4 cm aortic aneurysm, status post aortobiiliac   stent with SMA and bilateral renal artery stenting.    < end of copied text >                                          X-Rays                 unremarkable                                                              MEDICATIONS  (STANDING):  atorvastatin 80 milliGRAM(s) Oral at bedtime  furosemide    Tablet 20 milliGRAM(s) Oral daily  metoprolol succinate  milliGRAM(s) Oral daily  pantoprazole Infusion 8 mG/Hr (10 mL/Hr) IV Continuous <Continuous>      acetaminophen   Tablet .. 650 milliGRAM(s) Oral every 4 hours PRN Temp greater or equal to 38C (100.4F), Mild Pain (1 - 3)

## 2018-09-06 NOTE — CONSULT NOTE ADULT - ATTENDING COMMENTS
No contraindication for colonoscopy from vascular stand point. EVAR intact with no endoleak. Patient to f/u with his vascular surgeon.

## 2018-09-06 NOTE — CONSULT NOTE ADULT - ASSESSMENT
IMPRESSION:      PLAN:    CNS:    HEENT: Oral care    PULMONARY:  HOB @ 45 degrees    CARDIOVASCULAR:    GI: GI prophylaxis.  Feeding     RENAL:  Follow up lytes.  Correct as needed    INFECTIOUS DISEASE: Follow up cultures    HEMATOLOGICAL:  DVT prophylaxis.    ENDOCRINE:  Follow up FS.  Insulin protocol if needed.    MUSCULOSKELETAL: IMPRESSION:    Symptomatic Anemia s/p 2 unit pRBC  h/o LV thrombus on xarelto  doubt GI bleed    PLAN:    CNS: no sedation    HEENT: Oral care    PULMONARY:  HOB @ 45 degrees    CARDIOVASCULAR: I=O, f/u echo    GI: c/w with protonix drip, cbc q12h, GI consult, keep npo for now,     RENAL:  Follow up lytes.  Correct as needed    INFECTIOUS DISEASE: Follow up cultures    HEMATOLOGICAL:  continue to hold xarelto, cardio and GI follow up    ENDOCRINE:  Follow up FS.  Insulin protocol if needed.    MUSCULOSKELETAL: OOB to chair    Patient does not need MICU monitoring at this time IMPRESSION:    Symptomatic Anemia s/p 2 unit pRBC  h/o LV thrombus on xarelto  GI BLEED  BETTER WITH TX, CT A/P REVIEWED/ HX OF INFRARENAL ANEURYSM    PLAN:    CNS: AVOID CNS DEPRESSANT    HEENT: Oral care    PULMONARY:  HOB @ 45 degrees, aspiration precaution    CARDIOVASCULAR: I=O, f/u echo    GI: c/w with protonix drip, cbc q12h, GI consult, keep npo for now, VASCULAR F/UP FOR ANEURYSM    RENAL:  Follow up lytes.  Correct as needed    INFECTIOUS DISEASE: Follow up cultures    HEMATOLOGICAL:  continue to hold xarelto, cardio and GI follow up    ENDOCRINE:  Follow up FS.  Insulin protocol if needed.    MUSCULOSKELETAL: OOB to chair    WILL FOLLOW

## 2018-09-06 NOTE — H&P ADULT - ASSESSMENT
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 Transfuse if less than 8 or symptomatic  2 18g ivs in place   Cardio C/s to assess for a/c regarding thrombus and presence of bleed     CAD/MI: Continue with BB and Statin     HTN: Continue home meds     AAA s/p repair: Stable     GI PPx: on ppi ggt  DVT ppx: Sequentials for bleeding   Full Code  NPO for now

## 2018-09-06 NOTE — CONSULT NOTE ADULT - SUBJECTIVE AND OBJECTIVE BOX
VASCULAR SURGERY CONSULT NOTE      HPI:  73M with pmhx of AAA s/p EVAR 2011 (Dr. 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 Patient does admit to weight loss of 3-4 lbs in the last 3-4 months due slight dietary modification. Patient denied melena, hematochezia, hematuria, hematemesis, recent trauma, night sweats/pain, history of cancer, abdominal pain, n/v/d, rashes, or loss of consciousness. Stools have been reported as dark brown, not typically different from his usual bowel movements. No recent illness or abx usage. Patient also denied any history of anemia in the past.   Reported frequent daily NSAID usage for sciatic pain but stopped taking them ~1.5 months ago.   Patients last colo was in 2009 and showed 5mm non-malignant polyp on pathology.   Xarelto was started ~3 weeks ago by  Cardiologist. States he has tolerated it well. Previously was taking plavix.       In ED: T: 99   HR: 99    BP: 117/59   Hb was found to be 6 (last CBC in 2009 was Hb 14.9), transfused 2 u prbc. Stool was found to be dark brown on exam in ED (no guaiac done) (06 Sep 2018 01:41)        PAST MEDICAL & SURGICAL HISTORY:  Sciatica  HLD (hyperlipidemia)  CAD (coronary artery disease)  AAA (abdominal aortic aneurysm) without rupture  MI (myocardial infarction)  History of appendectomy  History of abdominal aortic aneurysm (AAA) repair    No Known Allergies    Home Medications:  Crestor 40 mg oral tablet: 1 tab(s) orally once a day (at bedtime) (06 Sep 2018 01:53)  famotidine 20 mg oral tablet: 1 tab(s) orally (06 Sep 2018 01:53)  furosemide 20 mg oral tablet: 1 tab(s) orally once a day (06 Sep 2018 01:53)  metoprolol: 120 milligram(s) orally (06 Sep 2018 01:53)  Xarelto 20 mg oral tablet: 1 tab(s) orally once a day (in the evening) (06 Sep 2018 01:53)    No permtinent family history of PVD    REVIEW OF SYSTEMS:  GENERAL:                                         negative  SKIN:                                                 negative  OPTHALMOLOGIC:                          negative  ENMT:                                               negative  RESPIRATORY AND THORAX:        negative  CARDIOVASCULAR:                        see HPI  GASTROINTESTINAL:                       see HPI  MUSCULOSKELETAL:                       negative  NEUROLOGIC:                                   negative  PSYCHIATRIC:                                    negative  HEMATOLOGY/LYMPHATICS:         negative  ENDOCRINE:                                     negative  ALLERGIC/IMMUNOLOGIC:            negative    12 point ROS otherwise normal except as stated in HPI    PHYSICAL EXAM  Vital Signs Last 24 Hrs  T(C): 36 (06 Sep 2018 07:38), Max: 36.7 (05 Sep 2018 14:00)  T(F): 96.8 (06 Sep 2018 07:38), Max: 98 (05 Sep 2018 14:00)  HR: 74 (06 Sep 2018 07:38) (70 - 99)  BP: 128/63 (06 Sep 2018 07:38) (92/56 - 128/63)  BP(mean): --  RR: 20 (06 Sep 2018 07:38) (18 - 20)  SpO2: 100% (06 Sep 2018 07:38) (100% - 100%)    Appearance: Normal	  HEENT:   Normal oral mucosa, PERRL, EOMI	  Neck: Supple, - JVD; Carotid Bruit   Cardiovascular: Normal S1 S2, No JVD, No murmurs,   Respiratory: Lungs clear to auscultation, No Rales, Rhonchi, Wheezing	  Gastrointestinal:  Soft, Non-tender, positive BS	  Skin: No rashes, No ecchymoses, No cyanosis  Extremities: Normal range of motion, No clubbing, cyanosis or edema  Vascular: Peripheral pulses palpable 2+ bilaterally  Neurologic: Non-focal  Psychiatry: A & O x 3, Mood & affect appropriate        MEDICATIONS:   MEDICATIONS  (STANDING):  atorvastatin 80 milliGRAM(s) Oral at bedtime  bisacodyl Suppository 10 milliGRAM(s) Rectal once  bisacodyl Suppository 15 milliGRAM(s) Rectal once  furosemide    Tablet 20 milliGRAM(s) Oral daily  heparin  Infusion 1600 Unit(s)/Hr (16 mL/Hr) IV Continuous <Continuous>  heparin  Injectable 7000 Unit(s) IV Push once  metoprolol succinate  milliGRAM(s) Oral daily  pantoprazole Infusion 8 mG/Hr (10 mL/Hr) IV Continuous <Continuous>  polyethylene glycol/electrolyte Solution. 4000 milliLiter(s) Oral once    MEDICATIONS  (PRN):  acetaminophen   Tablet .. 650 milliGRAM(s) Oral every 4 hours PRN Temp greater or equal to 38C (100.4F), Mild Pain (1 - 3)      LAB/STUDIES:                        8.8    7.38  )-----------( 228      ( 06 Sep 2018 09:36 )             27.5     09-06    140  |  101  |  13  ----------------------------<  102<H>  3.8   |  24  |  1.0    Ca    8.5      06 Sep 2018 09:36  Mg     2.2     09-06    TPro  6.1  /  Alb  3.9  /  TBili  0.8  /  DBili  x   /  AST  14  /  ALT  11  /  AlkPhos  28<L>  09-06    PT/INR - ( 06 Sep 2018 09:36 )   PT: 12.60 sec;   INR: 1.17 ratio         PTT - ( 06 Sep 2018 09:36 )  PTT:28.0 sec  LIVER FUNCTIONS - ( 06 Sep 2018 09:36 )  Alb: 3.9 g/dL / Pro: 6.1 g/dL / ALK PHOS: 28 U/L / ALT: 11 U/L / AST: 14 U/L / GGT: x               CARDIAC MARKERS ( 06 Sep 2018 09:36 )  x     / <0.01 ng/mL / 126 U/L / x     / 2.6 ng/mL  CARDIAC MARKERS ( 05 Sep 2018 17:14 )  x     / <0.01 ng/mL / x     / x     / x          IMAGING:    < from: CT Abdomen and Pelvis w/ IV Cont (09.05.18 @ 21:28) >  1. No evidence of acute intra-abdominal pathology.  2. Infrarenal abdominal 4 cm aortic aneurysm, status post aortobiiliac   stent with SMA and bilateral renal artery stenting.    < end of copied text >

## 2018-09-06 NOTE — H&P ADULT - PMH
AAA (abdominal aortic aneurysm) without rupture    CAD (coronary artery disease)    HLD (hyperlipidemia)    MI (myocardial infarction)    Sciatica

## 2018-09-06 NOTE — H&P ADULT - NSHPLABSRESULTS_GEN_ALL_CORE
6.0    8.16  )-----------( 243      ( 05 Sep 2018 17:14 )             19.5       09-05    139  |  101  |  15  ----------------------------<  88  3.8   |  24  |  1.1    Ca    8.4<L>      05 Sep 2018 17:14    TPro  5.6<L>  /  Alb  3.6  /  TBili  0.3  /  DBili  x   /  AST  13  /  ALT  10  /  AlkPhos  24<L>  09-05          PT/INR - ( 05 Sep 2018 17:14 )   PT: 16.50 sec;   INR: 1.53 ratio    PTT - ( 05 Sep 2018 17:14 )  PTT:29.1 sec    CARDIAC MARKERS ( 05 Sep 2018 17:14 )  x     / <0.01 ng/mL / x     / x     / x

## 2018-09-06 NOTE — H&P ADULT - ATTENDING COMMENTS
73 yr old male presented with Hx of  lt ventricular thrombus and aneurysm, presented with dyspnea on exertion.  VITAL SIGNS (Last 24 hrs):  T(C): 36 (09-06-18 @ 07:38), Max: 36.7 (09-05-18 @ 19:43)  HR: 74 (09-06-18 @ 07:38) (70 - 75)  BP: 128/63 (09-06-18 @ 07:38) (92/56 - 128/63)  RR: 20 (09-06-18 @ 07:38) (18 - 20)  SpO2: 100% (09-06-18 @ 07:38) (100% - 100%)  Wt(kg): --  Daily     Daily     I&O's Summary                        8.8    7.38  )-----------( 228      ( 06 Sep 2018 09:36 )             27.5   09-06    140  |  101  |  13  ----------------------------<  102<H>  3.8   |  24  |  1.0    Ca    8.5      06 Sep 2018 09:36  Mg     2.2     09-06    TPro  6.1  /  Alb  3.9  /  TBili  0.8  /  DBili  x   /  AST  14  /  ALT  11  /  AlkPhos  28<L>  09-06  ekg-NSR rate 73/min with LVH and poor R wave progression  o/e  aaox3  chest-b/l air entry  [id number]  abd/gi-soft, bs+  no edema  Assessment and plan:  # Severe anemia s/p 2 units of PRBC and black stool-- seen by GI--plan for egd and colonoscopy in AM  # CAD on plavix changed to xarelto  # LV thrombus on xarelto-- cardilogy eval-- may need clearnce  #Hx oF AAA- s/p repair

## 2018-09-07 ENCOUNTER — RESULT REVIEW (OUTPATIENT)
Age: 73
End: 2018-09-07

## 2018-09-07 LAB
ALBUMIN SERPL ELPH-MCNC: 3.6 G/DL — SIGNIFICANT CHANGE UP (ref 3.5–5.2)
ALP SERPL-CCNC: 26 U/L — LOW (ref 30–115)
ALT FLD-CCNC: 10 U/L — SIGNIFICANT CHANGE UP (ref 0–41)
ANION GAP SERPL CALC-SCNC: 14 MMOL/L — SIGNIFICANT CHANGE UP (ref 7–14)
APTT BLD: 180.5 SEC — CRITICAL HIGH (ref 27–39.2)
APTT BLD: 26.2 SEC — LOW (ref 27–39.2)
AST SERPL-CCNC: 15 U/L — SIGNIFICANT CHANGE UP (ref 0–41)
BASOPHILS # BLD AUTO: 0.04 K/UL — SIGNIFICANT CHANGE UP (ref 0–0.2)
BASOPHILS NFR BLD AUTO: 0.5 % — SIGNIFICANT CHANGE UP (ref 0–1)
BILIRUB SERPL-MCNC: 0.7 MG/DL — SIGNIFICANT CHANGE UP (ref 0.2–1.2)
BUN SERPL-MCNC: 13 MG/DL — SIGNIFICANT CHANGE UP (ref 10–20)
CALCIUM SERPL-MCNC: 8.6 MG/DL — SIGNIFICANT CHANGE UP (ref 8.5–10.1)
CHLORIDE SERPL-SCNC: 106 MMOL/L — SIGNIFICANT CHANGE UP (ref 98–110)
CO2 SERPL-SCNC: 23 MMOL/L — SIGNIFICANT CHANGE UP (ref 17–32)
CREAT SERPL-MCNC: 1 MG/DL — SIGNIFICANT CHANGE UP (ref 0.7–1.5)
EOSINOPHIL # BLD AUTO: 0.12 K/UL — SIGNIFICANT CHANGE UP (ref 0–0.7)
EOSINOPHIL NFR BLD AUTO: 1.6 % — SIGNIFICANT CHANGE UP (ref 0–8)
FERRITIN SERPL-MCNC: 26 NG/ML — LOW (ref 30–400)
FOLATE SERPL-MCNC: 7.4 NG/ML — SIGNIFICANT CHANGE UP
GLUCOSE SERPL-MCNC: 94 MG/DL — SIGNIFICANT CHANGE UP (ref 70–99)
HAPTOGLOB SERPL-MCNC: 197 MG/DL — SIGNIFICANT CHANGE UP (ref 34–200)
HCT VFR BLD CALC: 22.3 % — LOW (ref 42–52)
HCT VFR BLD CALC: 25.2 % — LOW (ref 42–52)
HGB BLD-MCNC: 7.2 G/DL — CRITICAL LOW (ref 14–18)
HGB BLD-MCNC: 8.1 G/DL — LOW (ref 14–18)
IMM GRANULOCYTES NFR BLD AUTO: 0.3 % — SIGNIFICANT CHANGE UP (ref 0.1–0.3)
INR BLD: 1.22 RATIO — SIGNIFICANT CHANGE UP (ref 0.65–1.3)
LYMPHOCYTES # BLD AUTO: 1.66 K/UL — SIGNIFICANT CHANGE UP (ref 1.2–3.4)
LYMPHOCYTES # BLD AUTO: 21.6 % — SIGNIFICANT CHANGE UP (ref 20.5–51.1)
MAGNESIUM SERPL-MCNC: 2.2 MG/DL — SIGNIFICANT CHANGE UP (ref 1.8–2.4)
MCHC RBC-ENTMCNC: 29.3 PG — SIGNIFICANT CHANGE UP (ref 27–31)
MCHC RBC-ENTMCNC: 29.3 PG — SIGNIFICANT CHANGE UP (ref 27–31)
MCHC RBC-ENTMCNC: 32.1 G/DL — SIGNIFICANT CHANGE UP (ref 32–37)
MCHC RBC-ENTMCNC: 32.3 G/DL — SIGNIFICANT CHANGE UP (ref 32–37)
MCV RBC AUTO: 90.7 FL — SIGNIFICANT CHANGE UP (ref 80–94)
MCV RBC AUTO: 91.3 FL — SIGNIFICANT CHANGE UP (ref 80–94)
MONOCYTES # BLD AUTO: 0.69 K/UL — HIGH (ref 0.1–0.6)
MONOCYTES NFR BLD AUTO: 9 % — SIGNIFICANT CHANGE UP (ref 1.7–9.3)
NEUTROPHILS # BLD AUTO: 5.15 K/UL — SIGNIFICANT CHANGE UP (ref 1.4–6.5)
NEUTROPHILS NFR BLD AUTO: 67 % — SIGNIFICANT CHANGE UP (ref 42.2–75.2)
NRBC # BLD: 0 /100 WBCS — SIGNIFICANT CHANGE UP (ref 0–0)
NRBC # BLD: 0 /100 WBCS — SIGNIFICANT CHANGE UP (ref 0–0)
PLATELET # BLD AUTO: 206 K/UL — SIGNIFICANT CHANGE UP (ref 130–400)
PLATELET # BLD AUTO: 215 K/UL — SIGNIFICANT CHANGE UP (ref 130–400)
POTASSIUM SERPL-MCNC: 4 MMOL/L — SIGNIFICANT CHANGE UP (ref 3.5–5)
POTASSIUM SERPL-SCNC: 4 MMOL/L — SIGNIFICANT CHANGE UP (ref 3.5–5)
PROT SERPL-MCNC: 5.5 G/DL — LOW (ref 6–8)
PROTHROM AB SERPL-ACNC: 13.2 SEC — HIGH (ref 9.95–12.87)
RBC # BLD: 2.46 M/UL — LOW (ref 4.7–6.1)
RBC # BLD: 2.76 M/UL — LOW (ref 4.7–6.1)
RBC # FLD: 15.1 % — HIGH (ref 11.5–14.5)
RBC # FLD: 15.1 % — HIGH (ref 11.5–14.5)
SODIUM SERPL-SCNC: 143 MMOL/L — SIGNIFICANT CHANGE UP (ref 135–146)
VIT B12 SERPL-MCNC: 242 PG/ML — SIGNIFICANT CHANGE UP (ref 232–1245)
WBC # BLD: 7.59 K/UL — SIGNIFICANT CHANGE UP (ref 4.8–10.8)
WBC # BLD: 7.68 K/UL — SIGNIFICANT CHANGE UP (ref 4.8–10.8)
WBC # FLD AUTO: 7.59 K/UL — SIGNIFICANT CHANGE UP (ref 4.8–10.8)
WBC # FLD AUTO: 7.68 K/UL — SIGNIFICANT CHANGE UP (ref 4.8–10.8)

## 2018-09-07 RX ORDER — MORPHINE SULFATE 50 MG/1
2 CAPSULE, EXTENDED RELEASE ORAL ONCE
Qty: 0 | Refills: 0 | Status: DISCONTINUED | OUTPATIENT
Start: 2018-09-07 | End: 2018-09-07

## 2018-09-07 RX ORDER — HEPARIN SODIUM 5000 [USP'U]/ML
1600 INJECTION INTRAVENOUS; SUBCUTANEOUS
Qty: 25000 | Refills: 0 | Status: DISCONTINUED | OUTPATIENT
Start: 2018-09-07 | End: 2018-09-08

## 2018-09-07 RX ORDER — SODIUM CHLORIDE 9 MG/ML
1000 INJECTION INTRAMUSCULAR; INTRAVENOUS; SUBCUTANEOUS
Qty: 0 | Refills: 0 | Status: DISCONTINUED | OUTPATIENT
Start: 2018-09-07 | End: 2018-09-07

## 2018-09-07 RX ORDER — RIVAROXABAN 15 MG-20MG
20 KIT ORAL EVERY 24 HOURS
Qty: 0 | Refills: 0 | Status: DISCONTINUED | OUTPATIENT
Start: 2018-09-07 | End: 2018-09-07

## 2018-09-07 RX ORDER — SODIUM CHLORIDE 9 MG/ML
500 INJECTION INTRAMUSCULAR; INTRAVENOUS; SUBCUTANEOUS ONCE
Qty: 0 | Refills: 0 | Status: COMPLETED | OUTPATIENT
Start: 2018-09-07 | End: 2018-09-07

## 2018-09-07 RX ORDER — PANTOPRAZOLE SODIUM 20 MG/1
40 TABLET, DELAYED RELEASE ORAL
Qty: 0 | Refills: 0 | Status: DISCONTINUED | OUTPATIENT
Start: 2018-09-07 | End: 2018-09-07

## 2018-09-07 RX ORDER — PANTOPRAZOLE SODIUM 20 MG/1
40 TABLET, DELAYED RELEASE ORAL
Qty: 0 | Refills: 0 | Status: DISCONTINUED | OUTPATIENT
Start: 2018-09-07 | End: 2018-09-10

## 2018-09-07 RX ADMIN — SODIUM CHLORIDE 500 MILLILITER(S): 9 INJECTION INTRAMUSCULAR; INTRAVENOUS; SUBCUTANEOUS at 09:36

## 2018-09-07 RX ADMIN — Medication 100 MILLIGRAM(S): at 06:36

## 2018-09-07 RX ADMIN — PANTOPRAZOLE SODIUM 40 MILLIGRAM(S): 20 TABLET, DELAYED RELEASE ORAL at 23:02

## 2018-09-07 RX ADMIN — HEPARIN SODIUM 16 UNIT(S)/HR: 5000 INJECTION INTRAVENOUS; SUBCUTANEOUS at 23:01

## 2018-09-07 RX ADMIN — Medication 650 MILLIGRAM(S): at 13:48

## 2018-09-07 RX ADMIN — ATORVASTATIN CALCIUM 80 MILLIGRAM(S): 80 TABLET, FILM COATED ORAL at 23:02

## 2018-09-07 RX ADMIN — MORPHINE SULFATE 2 MILLIGRAM(S): 50 CAPSULE, EXTENDED RELEASE ORAL at 09:39

## 2018-09-07 NOTE — PROGRESS NOTE ADULT - ASSESSMENT
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

## 2018-09-07 NOTE — PROGRESS NOTE ADULT - ASSESSMENT
# Severe anemia s/p 2 units of PRBC and black stool-- seen by GI--plan for egd and colonoscopy today-- hb is 8.7    # CAD on plavix changed to xarelto-- now on iv heparin    # LV thrombus on xarelto-- cardiology eval-- now on iv heparin    #Hx of AAA- s/p repair .

## 2018-09-08 LAB
ANION GAP SERPL CALC-SCNC: 15 MMOL/L — HIGH (ref 7–14)
APTT BLD: 26.1 SEC — LOW (ref 27–39.2)
APTT BLD: 26.8 SEC — LOW (ref 27–39.2)
APTT BLD: 37.5 SEC — SIGNIFICANT CHANGE UP (ref 27–39.2)
APTT BLD: 59.6 SEC — HIGH (ref 27–39.2)
BASOPHILS # BLD AUTO: 0.04 K/UL — SIGNIFICANT CHANGE UP (ref 0–0.2)
BASOPHILS NFR BLD AUTO: 0.6 % — SIGNIFICANT CHANGE UP (ref 0–1)
BLD GP AB SCN SERPL QL: SIGNIFICANT CHANGE UP
BUN SERPL-MCNC: 9 MG/DL — LOW (ref 10–20)
CALCIUM SERPL-MCNC: 8.4 MG/DL — LOW (ref 8.5–10.1)
CHLORIDE SERPL-SCNC: 105 MMOL/L — SIGNIFICANT CHANGE UP (ref 98–110)
CO2 SERPL-SCNC: 22 MMOL/L — SIGNIFICANT CHANGE UP (ref 17–32)
CREAT SERPL-MCNC: 0.9 MG/DL — SIGNIFICANT CHANGE UP (ref 0.7–1.5)
EOSINOPHIL # BLD AUTO: 0.16 K/UL — SIGNIFICANT CHANGE UP (ref 0–0.7)
EOSINOPHIL NFR BLD AUTO: 2.2 % — SIGNIFICANT CHANGE UP (ref 0–8)
GLUCOSE SERPL-MCNC: 86 MG/DL — SIGNIFICANT CHANGE UP (ref 70–99)
HCT VFR BLD CALC: 24.1 % — LOW (ref 42–52)
HGB BLD-MCNC: 7.6 G/DL — LOW (ref 14–18)
IMM GRANULOCYTES NFR BLD AUTO: 0.4 % — HIGH (ref 0.1–0.3)
LYMPHOCYTES # BLD AUTO: 1.59 K/UL — SIGNIFICANT CHANGE UP (ref 1.2–3.4)
LYMPHOCYTES # BLD AUTO: 22 % — SIGNIFICANT CHANGE UP (ref 20.5–51.1)
MAGNESIUM SERPL-MCNC: 2.2 MG/DL — SIGNIFICANT CHANGE UP (ref 1.8–2.4)
MCHC RBC-ENTMCNC: 29.1 PG — SIGNIFICANT CHANGE UP (ref 27–31)
MCHC RBC-ENTMCNC: 31.5 G/DL — LOW (ref 32–37)
MCV RBC AUTO: 92.3 FL — SIGNIFICANT CHANGE UP (ref 80–94)
MONOCYTES # BLD AUTO: 0.64 K/UL — HIGH (ref 0.1–0.6)
MONOCYTES NFR BLD AUTO: 8.9 % — SIGNIFICANT CHANGE UP (ref 1.7–9.3)
NEUTROPHILS # BLD AUTO: 4.76 K/UL — SIGNIFICANT CHANGE UP (ref 1.4–6.5)
NEUTROPHILS NFR BLD AUTO: 65.9 % — SIGNIFICANT CHANGE UP (ref 42.2–75.2)
NRBC # BLD: 0 /100 WBCS — SIGNIFICANT CHANGE UP (ref 0–0)
PLATELET # BLD AUTO: 211 K/UL — SIGNIFICANT CHANGE UP (ref 130–400)
POTASSIUM SERPL-MCNC: 4 MMOL/L — SIGNIFICANT CHANGE UP (ref 3.5–5)
POTASSIUM SERPL-SCNC: 4 MMOL/L — SIGNIFICANT CHANGE UP (ref 3.5–5)
RBC # BLD: 2.61 M/UL — LOW (ref 4.7–6.1)
RBC # FLD: 15 % — HIGH (ref 11.5–14.5)
SODIUM SERPL-SCNC: 142 MMOL/L — SIGNIFICANT CHANGE UP (ref 135–146)
TYPE + AB SCN PNL BLD: SIGNIFICANT CHANGE UP
WBC # BLD: 7.22 K/UL — SIGNIFICANT CHANGE UP (ref 4.8–10.8)
WBC # FLD AUTO: 7.22 K/UL — SIGNIFICANT CHANGE UP (ref 4.8–10.8)

## 2018-09-08 RX ORDER — HEPARIN SODIUM 5000 [USP'U]/ML
1600 INJECTION INTRAVENOUS; SUBCUTANEOUS
Qty: 25000 | Refills: 0 | Status: DISCONTINUED | OUTPATIENT
Start: 2018-09-08 | End: 2018-09-08

## 2018-09-08 RX ORDER — HEPARIN SODIUM 5000 [USP'U]/ML
1400 INJECTION INTRAVENOUS; SUBCUTANEOUS
Qty: 25000 | Refills: 0 | Status: DISCONTINUED | OUTPATIENT
Start: 2018-09-08 | End: 2018-09-09

## 2018-09-08 RX ORDER — HEPARIN SODIUM 5000 [USP'U]/ML
1300 INJECTION INTRAVENOUS; SUBCUTANEOUS
Qty: 25000 | Refills: 0 | Status: DISCONTINUED | OUTPATIENT
Start: 2018-09-08 | End: 2018-09-08

## 2018-09-08 RX ADMIN — ATORVASTATIN CALCIUM 80 MILLIGRAM(S): 80 TABLET, FILM COATED ORAL at 22:23

## 2018-09-08 RX ADMIN — Medication 20 MILLIGRAM(S): at 06:08

## 2018-09-08 RX ADMIN — Medication 100 MILLIGRAM(S): at 06:08

## 2018-09-08 RX ADMIN — HEPARIN SODIUM 13 UNIT(S)/HR: 5000 INJECTION INTRAVENOUS; SUBCUTANEOUS at 09:22

## 2018-09-08 RX ADMIN — PANTOPRAZOLE SODIUM 40 MILLIGRAM(S): 20 TABLET, DELAYED RELEASE ORAL at 06:08

## 2018-09-08 NOTE — PROGRESS NOTE ADULT - ASSESSMENT
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 [phone]

## 2018-09-08 NOTE — CHART NOTE - NSCHARTNOTEFT_GEN_A_CORE
9/8/2018 @ 0000     Contaced by RN for patient refusing Heparin drip 9/8/2018 @ 0000     Contacted by RN for patient refusing Heparin drip    Spoke with patient who stated that they only want to take medications by mouth and no longer want anything through the IV. They are AAOx3. Explained risks of not being on the heparin drip. Patient stated they understand. Will follow up with the day team.

## 2018-09-09 LAB
ANION GAP SERPL CALC-SCNC: 13 MMOL/L — SIGNIFICANT CHANGE UP (ref 7–14)
APTT BLD: 87.3 SEC — CRITICAL HIGH (ref 27–39.2)
APTT BLD: 89.2 SEC — CRITICAL HIGH (ref 27–39.2)
BUN SERPL-MCNC: 13 MG/DL — SIGNIFICANT CHANGE UP (ref 10–20)
CALCIUM SERPL-MCNC: 8.6 MG/DL — SIGNIFICANT CHANGE UP (ref 8.5–10.1)
CHLORIDE SERPL-SCNC: 102 MMOL/L — SIGNIFICANT CHANGE UP (ref 98–110)
CO2 SERPL-SCNC: 22 MMOL/L — SIGNIFICANT CHANGE UP (ref 17–32)
CREAT SERPL-MCNC: 1 MG/DL — SIGNIFICANT CHANGE UP (ref 0.7–1.5)
GLUCOSE SERPL-MCNC: 107 MG/DL — HIGH (ref 70–99)
HCT VFR BLD CALC: 25.7 % — LOW (ref 42–52)
HGB BLD-MCNC: 8.1 G/DL — LOW (ref 14–18)
MCHC RBC-ENTMCNC: 28.8 PG — SIGNIFICANT CHANGE UP (ref 27–31)
MCHC RBC-ENTMCNC: 31.5 G/DL — LOW (ref 32–37)
MCV RBC AUTO: 91.5 FL — SIGNIFICANT CHANGE UP (ref 80–94)
NRBC # BLD: 0 /100 WBCS — SIGNIFICANT CHANGE UP (ref 0–0)
PLATELET # BLD AUTO: 242 K/UL — SIGNIFICANT CHANGE UP (ref 130–400)
POTASSIUM SERPL-MCNC: 4.3 MMOL/L — SIGNIFICANT CHANGE UP (ref 3.5–5)
POTASSIUM SERPL-SCNC: 4.3 MMOL/L — SIGNIFICANT CHANGE UP (ref 3.5–5)
RBC # BLD: 2.81 M/UL — LOW (ref 4.7–6.1)
RBC # FLD: 14.8 % — HIGH (ref 11.5–14.5)
SODIUM SERPL-SCNC: 137 MMOL/L — SIGNIFICANT CHANGE UP (ref 135–146)
WBC # BLD: 7.82 K/UL — SIGNIFICANT CHANGE UP (ref 4.8–10.8)
WBC # FLD AUTO: 7.82 K/UL — SIGNIFICANT CHANGE UP (ref 4.8–10.8)

## 2018-09-09 RX ORDER — RIVAROXABAN 15 MG-20MG
20 KIT ORAL EVERY 24 HOURS
Qty: 0 | Refills: 0 | Status: DISCONTINUED | OUTPATIENT
Start: 2018-09-09 | End: 2018-09-10

## 2018-09-09 RX ADMIN — RIVAROXABAN 20 MILLIGRAM(S): KIT at 13:14

## 2018-09-09 RX ADMIN — ATORVASTATIN CALCIUM 80 MILLIGRAM(S): 80 TABLET, FILM COATED ORAL at 21:35

## 2018-09-09 RX ADMIN — Medication 20 MILLIGRAM(S): at 06:19

## 2018-09-09 RX ADMIN — Medication 100 MILLIGRAM(S): at 06:20

## 2018-09-09 RX ADMIN — PANTOPRAZOLE SODIUM 40 MILLIGRAM(S): 20 TABLET, DELAYED RELEASE ORAL at 06:20

## 2018-09-09 NOTE — PROGRESS NOTE ADULT - ASSESSMENT
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 [phone]

## 2018-09-10 ENCOUNTER — TRANSCRIPTION ENCOUNTER (OUTPATIENT)
Age: 73
End: 2018-09-10

## 2018-09-10 VITALS
OXYGEN SATURATION: 97 % | TEMPERATURE: 98 F | HEART RATE: 71 BPM | RESPIRATION RATE: 20 BRPM | DIASTOLIC BLOOD PRESSURE: 59 MMHG | SYSTOLIC BLOOD PRESSURE: 97 MMHG

## 2018-09-10 LAB
ALBUMIN SERPL ELPH-MCNC: 4 G/DL — SIGNIFICANT CHANGE UP (ref 3.5–5.2)
ALP SERPL-CCNC: 33 U/L — SIGNIFICANT CHANGE UP (ref 30–115)
ALT FLD-CCNC: 27 U/L — SIGNIFICANT CHANGE UP (ref 0–41)
ANION GAP SERPL CALC-SCNC: 16 MMOL/L — HIGH (ref 7–14)
AST SERPL-CCNC: 33 U/L — SIGNIFICANT CHANGE UP (ref 0–41)
BASOPHILS # BLD AUTO: 0.07 K/UL — SIGNIFICANT CHANGE UP (ref 0–0.2)
BASOPHILS NFR BLD AUTO: 1 % — SIGNIFICANT CHANGE UP (ref 0–1)
BILIRUB SERPL-MCNC: 0.6 MG/DL — SIGNIFICANT CHANGE UP (ref 0.2–1.2)
BUN SERPL-MCNC: 14 MG/DL — SIGNIFICANT CHANGE UP (ref 10–20)
CALCIUM SERPL-MCNC: 8.8 MG/DL — SIGNIFICANT CHANGE UP (ref 8.5–10.1)
CHLORIDE SERPL-SCNC: 99 MMOL/L — SIGNIFICANT CHANGE UP (ref 98–110)
CO2 SERPL-SCNC: 21 MMOL/L — SIGNIFICANT CHANGE UP (ref 17–32)
CREAT SERPL-MCNC: 1.1 MG/DL — SIGNIFICANT CHANGE UP (ref 0.7–1.5)
EOSINOPHIL # BLD AUTO: 0.29 K/UL — SIGNIFICANT CHANGE UP (ref 0–0.7)
EOSINOPHIL NFR BLD AUTO: 4.2 % — SIGNIFICANT CHANGE UP (ref 0–8)
GLUCOSE SERPL-MCNC: 168 MG/DL — HIGH (ref 70–99)
HCT VFR BLD CALC: 28.1 % — LOW (ref 42–52)
HGB BLD-MCNC: 8.7 G/DL — LOW (ref 14–18)
IMM GRANULOCYTES NFR BLD AUTO: 0.3 % — SIGNIFICANT CHANGE UP (ref 0.1–0.3)
LYMPHOCYTES # BLD AUTO: 1.79 K/UL — SIGNIFICANT CHANGE UP (ref 1.2–3.4)
LYMPHOCYTES # BLD AUTO: 25.6 % — SIGNIFICANT CHANGE UP (ref 20.5–51.1)
MAGNESIUM SERPL-MCNC: 2.2 MG/DL — SIGNIFICANT CHANGE UP (ref 1.8–2.4)
MCHC RBC-ENTMCNC: 28.7 PG — SIGNIFICANT CHANGE UP (ref 27–31)
MCHC RBC-ENTMCNC: 31 G/DL — LOW (ref 32–37)
MCV RBC AUTO: 92.7 FL — SIGNIFICANT CHANGE UP (ref 80–94)
MONOCYTES # BLD AUTO: 0.5 K/UL — SIGNIFICANT CHANGE UP (ref 0.1–0.6)
MONOCYTES NFR BLD AUTO: 7.2 % — SIGNIFICANT CHANGE UP (ref 1.7–9.3)
NEUTROPHILS # BLD AUTO: 4.31 K/UL — SIGNIFICANT CHANGE UP (ref 1.4–6.5)
NEUTROPHILS NFR BLD AUTO: 61.7 % — SIGNIFICANT CHANGE UP (ref 42.2–75.2)
NRBC # BLD: 0 /100 WBCS — SIGNIFICANT CHANGE UP (ref 0–0)
PLATELET # BLD AUTO: 244 K/UL — SIGNIFICANT CHANGE UP (ref 130–400)
POTASSIUM SERPL-MCNC: 4.4 MMOL/L — SIGNIFICANT CHANGE UP (ref 3.5–5)
POTASSIUM SERPL-SCNC: 4.4 MMOL/L — SIGNIFICANT CHANGE UP (ref 3.5–5)
PROT SERPL-MCNC: 6.4 G/DL — SIGNIFICANT CHANGE UP (ref 6–8)
RBC # BLD: 3.03 M/UL — LOW (ref 4.7–6.1)
RBC # FLD: 14.8 % — HIGH (ref 11.5–14.5)
SODIUM SERPL-SCNC: 136 MMOL/L — SIGNIFICANT CHANGE UP (ref 135–146)
SURGICAL PATHOLOGY STUDY: SIGNIFICANT CHANGE UP
WBC # BLD: 6.98 K/UL — SIGNIFICANT CHANGE UP (ref 4.8–10.8)
WBC # FLD AUTO: 6.98 K/UL — SIGNIFICANT CHANGE UP (ref 4.8–10.8)

## 2018-09-10 RX ORDER — METOPROLOL TARTRATE 50 MG
1 TABLET ORAL
Qty: 0 | Refills: 0 | COMMUNITY
Start: 2018-09-10

## 2018-09-10 RX ORDER — PANTOPRAZOLE SODIUM 20 MG/1
1 TABLET, DELAYED RELEASE ORAL
Qty: 30 | Refills: 2 | OUTPATIENT
Start: 2018-09-10 | End: 2018-12-08

## 2018-09-10 RX ORDER — METOPROLOL TARTRATE 50 MG
120 TABLET ORAL
Qty: 0 | Refills: 0 | COMMUNITY

## 2018-09-10 RX ORDER — FAMOTIDINE 10 MG/ML
1 INJECTION INTRAVENOUS
Qty: 0 | Refills: 0 | COMMUNITY

## 2018-09-10 RX ADMIN — Medication 20 MILLIGRAM(S): at 06:14

## 2018-09-10 RX ADMIN — Medication 100 MILLIGRAM(S): at 06:14

## 2018-09-10 RX ADMIN — RIVAROXABAN 20 MILLIGRAM(S): KIT at 11:02

## 2018-09-10 RX ADMIN — PANTOPRAZOLE SODIUM 40 MILLIGRAM(S): 20 TABLET, DELAYED RELEASE ORAL at 06:14

## 2018-09-10 NOTE — DISCHARGE NOTE ADULT - CARE PROVIDER_API CALL
Carlton Savage), Gastroenterology  [address] [place] [address]  Phone: [phone]  Fax: [phone]    Cal Sequeira), Internal Medicine  [address] [place] [id number]  Phone: [phone]  Fax: [phone]    Kurt Cruz), Cardiovascular Disease; Internal Medicine; Interventional Cardiology  [address] [place] [address] [address]  [place] [address]  Phone: [phone]  Fax: [phone]

## 2018-09-10 NOTE — DISCHARGE NOTE ADULT - HOSPITAL COURSE
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 He was found to have hemoglobin 6.0 s/p transfusion likely due to GIB. Hemoglobin improved to 8.7 after transfusion and patient had no episodes of bright red blood per rectum since 9/7. EGD showed mild gastritis and multiple biopsies were taken. Colonoscopy showed dark fluid suggestive of melena in the terminal ileum, friable mucosa 5cm from the anal verge, and no active bleeding lesions. Capsule endoscopy was done, and GI cleared the patient for discharge and result follow up as an outpatient. Cardiology also evaluated the patient due to history of LV thrombus. Patient is hemodynamically stable for discharge. 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 He was found to have hemoglobin 6.0 . Pt received PRBC  transfusion  Hemoglobin improved to 8.7 after transfusion and patient has  had no episodes of bright red blood per rectum since 9/7. EGD showed mild gastritis and multiple biopsies were taken. Colonoscopy showed dark fluid suggestive of melena in the terminal ileum, friable mucosa 5cm from the anal verge, and no active bleeding lesions. Capsule endoscopy was done, and GI cleared the patient for discharge and to follow up for results as an outpatient. Cardiology also evaluated the patient due to history of LV thrombus. Patient is hemodynamically stable for discharge.       #symptomatic normocytic anemia secondary to GI bleed, no further episodes of  bright red blood per rectum since 9/7.  -s/p 2 U PRBCs 9/6, Hb 8.1  -EGD/colonoscopy did not reveal site of blood except for small black fluid at terminal ileum  - Capsule endoscopy done. f/u results  - PO PPI QD  - % Saturation, Iron: 14 %, Total Binding Capacity.: 326 ug/dL, Iron Total, Serum: 45 ug/dL    #LV thrombus  - c/w Xarelto     #Coronary Artery Disease /MI   Continue with metoprolol and Statin     #Hypertension:   Continue Metoprolol    #AAA s/p s/p EVAR 2011 (Dr. Leblanc [place]),  -Last seen by Dr. Leblanc 2 wks ago. No endoleak on CTA 9/5  -will need OP vascular follow up

## 2018-09-10 NOTE — CONSULT NOTE ADULT - SUBJECTIVE AND OBJECTIVE BOX
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 Patient does admit to weight loss of 3-4 lbs in the last 3-4 months due slight dietary modification. Patient denied melena, hematochezia, hematuria, hematemesis, recent trauma, night sweats/pain, history of cancer, abdominal pain, n/v/d, rashes, or loss of consciousness. Stools have been reported as dark brown, not typically different from his usual bowel movements. No recent illness or abx usage. Patient also denied any history of anemia in the past.   Reported frequent daily NSAID usage for sciatic pain but stopped taking them ~1.5 months ago.   Patients last colo was in 2009 and showed 5mm non-malignant polyp on pathology.   Xarelto was started ~3 weeks ago by  Cardiologist. States he has tolerated it well. Previously was taking plavix.   In ED: T: 99   HR: 99    BP: 117/59   Hb was found to be 6 (last CBC in 2009 was Hb 14.9), transfused 2 u prbc. Stool was found to be dark brown on exam in ED (no guaiac done) (06 Sep 2018 01:41). s/p EGD/colonoscopy, did not reveal site of blood except for small black fluid at terminal ileum. Capsule endoscopy done.   patient was started on heparin drip for the LV thrombus then switched to xarelto.       ROS:  All other systems reviewed and are negative    PAST MEDICAL & SURGICAL HISTORY  Sciatica  HLD (hyperlipidemia)  CAD (coronary artery disease)  AAA (abdominal aortic aneurysm) without rupture  MI (myocardial infarction)  History of appendectomy  History of abdominal aortic aneurysm (AAA) repair      FAMILY HISTORY:  FAMILY HISTORY:  Family history of oral cancer (Father)        ALLERGIES:  No Known Allergies      MEDICATIONS:  MEDICATIONS  (STANDING):  atorvastatin 80 milliGRAM(s) Oral at bedtime  furosemide    Tablet 20 milliGRAM(s) Oral daily  metoprolol succinate  milliGRAM(s) Oral daily  pantoprazole    Tablet 40 milliGRAM(s) Oral before breakfast  rivaroxaban 20 milliGRAM(s) Oral every 24 hours    MEDICATIONS  (PRN):  acetaminophen   Tablet .. 650 milliGRAM(s) Oral every 4 hours PRN Temp greater or equal to 38C (100.4F), Mild Pain (1 - 3)      HOME MEDICATIONS:  Home Medications:  Crestor 40 mg oral tablet: 1 tab(s) orally once a day (at bedtime) (06 Sep 2018 01:53)  famotidine 20 mg oral tablet: 1 tab(s) orally (06 Sep 2018 01:53)  furosemide 20 mg oral tablet: 1 tab(s) orally once a day (06 Sep 2018 01:53)  metoprolol: 120 milligram(s) orally (06 Sep 2018 01:53)  Xarelto 20 mg oral tablet: 1 tab(s) orally once a day (in the evening) (06 Sep 2018 01:53)      VITALS:   T(F): 97.6 (09-10 @ 06:32), Max: 98.2 (09-07 @ 10:01)  HR: 74 (09-10 @ 06:32) (68 - 90)  BP: 110/64 (09-10 @ 06:32) (101/59 - 140/63)  BP(mean): --  RR: 20 (09-10 @ 06:32) (18 - 20)  SpO2: 98% (09-08 @ 15:48) (97% - 99%)    I&O's Summary    09 Sep 2018 07:01  -  10 Sep 2018 07:00  --------------------------------------------------------  IN: 240 mL / OUT: 200 mL / NET: 40 mL    10 Sep 2018 07:01  -  10 Sep 2018 10:00  --------------------------------------------------------  IN: 0 mL / OUT: 400 mL / NET: -400 mL        PHYSICAL EXAM:  GEN: Alert and oriented X 3, Well nourished, No acute distress  NECK: Supple, no JVD  LUNGS: Clear to auscultation bilaterally  CARDIOVASCULAR: S1/S2 regular , no murmus or rubs  ABD: Soft, non-tender  EXT: No Lower extremity edema/cyanosis  NEURO: Non focal  SKIN: Intact    LABS:                        8.1    7.82  )-----------( 242      ( 09 Sep 2018 13:00 )             25.7     09-09    137  |  102  |  13  ----------------------------<  107<H>  4.3   |  22  |  1.0    Ca    8.6      09 Sep 2018 13:00      PTT - ( 09 Sep 2018 06:51 )  PTT:87.3 sec            RADIOLOGY:  -CXR:  < from: Xray Chest 1 View- PORTABLE-Urgent (09.05.18 @ 18:53) >    Impression:      No radiographic evidence of acute cardiopulmonary disease.         -TTE:  < from: Transthoracic Echocardiogram (09.06.18 @ 08:36) >  Summary:   1. Left ventricular ejection fraction, by visual estimation, is 35 to   40%.   2. Spectral Doppler shows impaired relaxation pattern of left   ventricular myocardial filling (Grade I diastolic dysfunction).   3. Mild tricuspid regurgitation.   4. Mildly thickened and calcified aortic leaflets with reduced opening.   Partial fusion of the non-coronary and left cusps.   5. Trace pulmonic valve regurgitation.        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 Patient does admit to weight loss of 3-4 lbs in the last 3-4 months due slight dietary modification. Patient denied melena, hematochezia, hematuria, hematemesis, recent trauma, night sweats/pain, history of cancer, abdominal pain, n/v/d, rashes, or loss of consciousness. Stools have been reported as dark brown, not typically different from his usual bowel movements. No recent illness or abx usage. Patient also denied any history of anemia in the past.   Reported frequent daily NSAID usage for sciatic pain but stopped taking them ~1.5 months ago.   Patients last colo was in 2009 and showed 5mm non-malignant polyp on pathology.   Xarelto was started ~3 weeks ago by  Cardiologist. States he has tolerated it well. Previously was taking plavix.   In ED: T: 99   HR: 99    BP: 117/59   Hb was found to be 6 (last CBC in 2009 was Hb 14.9), transfused 2 u prbc. Stool was found to be dark brown on exam in ED (no guaiac done) (06 Sep 2018 01:41). s/p EGD/colonoscopy, did not reveal site of blood except for small black fluid at terminal ileum. Capsule endoscopy done.   patient was started on heparin drip for the LV thrombus for possible other procedure then switched to xarelto.       ROS:  All other systems reviewed and are negative    PAST MEDICAL & SURGICAL HISTORY  Sciatica  HLD (hyperlipidemia)  CAD (coronary artery disease)  AAA (abdominal aortic aneurysm) without rupture  MI (myocardial infarction)  History of appendectomy  History of abdominal aortic aneurysm (AAA) repair      FAMILY HISTORY:  FAMILY HISTORY:  Family history of oral cancer (Father)        ALLERGIES:  No Known Allergies    Social  Active smoker   no alcohol        MEDICATIONS:  MEDICATIONS  (STANDING):  atorvastatin 80 milliGRAM(s) Oral at bedtime  furosemide    Tablet 20 milliGRAM(s) Oral daily  metoprolol succinate  milliGRAM(s) Oral daily  pantoprazole    Tablet 40 milliGRAM(s) Oral before breakfast  rivaroxaban 20 milliGRAM(s) Oral every 24 hours    MEDICATIONS  (PRN):  acetaminophen   Tablet .. 650 milliGRAM(s) Oral every 4 hours PRN Temp greater or equal to 38C (100.4F), Mild Pain (1 - 3)      HOME MEDICATIONS:  Home Medications:  Crestor 40 mg oral tablet: 1 tab(s) orally once a day (at bedtime) (06 Sep 2018 01:53)  famotidine 20 mg oral tablet: 1 tab(s) orally (06 Sep 2018 01:53)  furosemide 20 mg oral tablet: 1 tab(s) orally once a day (06 Sep 2018 01:53)  metoprolol: 120 milligram(s) orally (06 Sep 2018 01:53)  Xarelto 20 mg oral tablet: 1 tab(s) orally once a day (in the evening) (06 Sep 2018 01:53)      VITALS:   T(F): 97.6 (09-10 @ 06:32), Max: 98.2 (09-07 @ 10:01)  HR: 74 (09-10 @ 06:32) (68 - 90)  BP: 110/64 (09-10 @ 06:32) (101/59 - 140/63)  BP(mean): --  RR: 20 (09-10 @ 06:32) (18 - 20)  SpO2: 98% (09-08 @ 15:48) (97% - 99%)    I&O's Summary    09 Sep 2018 07:01  -  10 Sep 2018 07:00  --------------------------------------------------------  IN: 240 mL / OUT: 200 mL / NET: 40 mL    10 Sep 2018 07:01  -  10 Sep 2018 10:00  --------------------------------------------------------  IN: 0 mL / OUT: 400 mL / NET: -400 mL        PHYSICAL EXAM:  GEN: Alert and oriented X 3, Well nourished, No acute distress  NECK: Supple, no JVD  LUNGS: Clear to auscultation bilaterally  CARDIOVASCULAR: S1/S2 regular , no murmus or rubs  ABD: Soft, non-tender  EXT: No Lower extremity edema/cyanosis  NEURO: Non focal  SKIN: Intact    LABS:                        8.1    7.82  )-----------( 242      ( 09 Sep 2018 13:00 )             25.7     09-09    137  |  102  |  13  ----------------------------<  107<H>  4.3   |  22  |  1.0    Ca    8.6      09 Sep 2018 13:00      PTT - ( 09 Sep 2018 06:51 )  PTT:87.3 sec            RADIOLOGY:  -CXR:  < from: Xray Chest 1 View- PORTABLE-Urgent (09.05.18 @ 18:53) >    Impression:      No radiographic evidence of acute cardiopulmonary disease.         -TTE:  < from: Transthoracic Echocardiogram (09.06.18 @ 08:36) >  Summary:   1. Left ventricular ejection fraction, by visual estimation, is 35 to   40%.   2. Spectral Doppler shows impaired relaxation pattern of left   ventricular myocardial filling (Grade I diastolic dysfunction).   3. Mild tricuspid regurgitation.   4. Mildly thickened and calcified aortic leaflets with reduced opening.   Partial fusion of the non-coronary and left cusps.   5. Trace pulmonic valve regurgitation.        ECG:  < from: 12 Lead ECG (09.05.18 @ 18:36) >    Ventricular Rate 73 BPM    Atrial Rate 73 BPM    P-R Interval 180 ms    QRS Duration 86 ms    Q-T Interval 356 ms    QTC Calculation(Bezet) 392 ms    P Axis 51 degrees    R Axis -33 degrees    T Axis 106 degrees    Diagnosis Line Normal sinus rhythm  Left axis deviation  Moderate voltage criteria for LVH, may be normal variant  Poor R wave progression  Abnormal ECG

## 2018-09-10 NOTE — DISCHARGE NOTE ADULT - PLAN OF CARE
medically optimized medically optimized and patient stablized to be discharged home Gastroenterology workup  negative including EGD/colonoscopy did not reveal site of blood except for small black fluid at terminal ileum .Capsule endoscopy reported negative  patient to f/u Gastroenterology and PMD as an outpatient Gastroenterology workup  negative including EGD/colonoscopy did not reveal site of blood except for small black fluid at terminal ileum. Capsule endoscopy to be followed up as an outpatient. Patient to f/u Gastroenterology and PMD as an outpatient Continue medical management. Continue with Xarelto, and follow up with Dr. Cruz this Thursday at 11:45

## 2018-09-10 NOTE — CONSULT NOTE ADULT - ASSESSMENT
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 patient was found to have acute symptomatic anemia with HB of 6. s/p EGD, colonoscopy and capsule endoscopy with pending result. 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 patient was found to have acute symptomatic anemia with HB of 6. s/p EGD, colonoscopy and capsule endoscopy with pending result. 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 patient was found to have acute symptomatic anemia with HB of 6. s/p EGD, colonoscopy and capsule endoscopy with pending result. Cardiology was consulted regarding restarting Anticoagulation    #LV thrombus   patient CT of the abdomen showed he has LV thrombus and aneurysm three month ago  Patient was started on xarelto for LV thrombus three month ago    Plan  ·	restart xarelto   ·	f/u with Dr. Rodriguez as outpatient  ·	case was disscuseed with cardiology fellow and Dr. Camp over the phone who recommended c/w xarelto

## 2018-09-10 NOTE — PROGRESS NOTE ADULT - REASON FOR ADMISSION
Symptomatic Anemia

## 2018-09-10 NOTE — PROGRESS NOTE ADULT - SUBJECTIVE AND OBJECTIVE BOX
Chief Complaint:  Patient is a 73y old  Male who presents with a chief complaint of Symptomatic Anemia (08 Sep 2018 17:16)      Interval Events:     Allergies:  No Known Allergies      Home Medications:    Hospital Medications:  acetaminophen   Tablet .. 650 milliGRAM(s) Oral every 4 hours PRN  atorvastatin 80 milliGRAM(s) Oral at bedtime  furosemide    Tablet 20 milliGRAM(s) Oral daily  metoprolol succinate  milliGRAM(s) Oral daily  pantoprazole    Tablet 40 milliGRAM(s) Oral before breakfast  rivaroxaban 20 milliGRAM(s) Oral every 24 hours      PMHX/PSHX:  Sciatica  HLD (hyperlipidemia)  CAD (coronary artery disease)  AAA (abdominal aortic aneurysm) without rupture  MI (myocardial infarction)  History of appendectomy  History of abdominal aortic aneurysm (AAA) repair      Family history:  Family history of oral cancer (Father)      ROS:   As mentioned below      PHYSICAL EXAM:   Vital Signs:  Vital Signs Last 24 Hrs  T(C): 36.6 (09 Sep 2018 07:12), Max: 36.7 (08 Sep 2018 15:48)  T(F): 97.8 (09 Sep 2018 07:12), Max: 98 (08 Sep 2018 15:48)  HR: 77 (09 Sep 2018 07:12) (74 - 84)  BP: 101/59 (09 Sep 2018 07:12) (101/59 - 114/59)  BP(mean): --  RR: 20 (09 Sep 2018 07:12) (20 - 20)  SpO2: 98% (08 Sep 2018 15:48) (98% - 98%)  Daily     Daily     GENERAL:  NAD  HEENT:  NC/AT,  No Thyromegaly  CHEST:  CTA B/L  HEART:  S1, S2- No M, R, G  ABDOMEN:  Soft, NT, ND  EXTEREMITIES:  no cyanosis,clubbing or edema  SKIN:  NAD  NEURO:  Grossly Nr.    LABS:                        8.1    7.82  )-----------( 242      ( 09 Sep 2018 13:00 )             25.7     09-09    137  |  102  |  13  ----------------------------<  107<H>  4.3   |  22  |  1.0    Ca    8.6      09 Sep 2018 13:00  Mg     2.2     09-08        PTT - ( 09 Sep 2018 06:51 )  PTT:87.3 sec        Imaging:
INTERVAL HPI/OVERNIGHT EVENTS:    A 73M with pmhx of AAA s/p stent, CAD/MI, Left ventricular thrombus/aneurysm (on Xarelto started 3 weeks ago and plavix was stopped atthattime), HTN, sciatica, and HLD presents for 4 days of progressive dyspnea on exertion, pre syncope, and dizziness. Patient reports having dark brown stool last week .He denies any hx of GI bleed in the past, Hematochezia, hematemesis, abdominal pain, dysphagia, odynophagia.Patient does admit to weight loss of 3-4 lbs in the last 3-4 months due slight dietary modification. he also  reported frequent daily NSAID usage for sciatic pain but stopped taking them ~1.5 months ago.  Last Colonoscopy in 2009 by Dr renee narayanan showed polyp and never had an EGD     9/10 patient has no bms, no complaints     PAST MEDICAL & SURGICAL HISTORY:  Sciatica  HLD (hyperlipidemia)  CAD (coronary artery disease)  AAA (abdominal aortic aneurysm) without rupture  MI (myocardial infarction)  History of appendectomy  History of abdominal aortic aneurysm (AAA) repair      Home Medications:  Crestor 40 mg oral tablet: 1 tab(s) orally once a day (at bedtime) (06 Sep 2018 01:53)  furosemide 20 mg oral tablet: 1 tab(s) orally once a day (06 Sep 2018 01:53)  Metoprolol Succinate  mg oral tablet, extended release: 1 tab(s) orally once a day (10 Sep 2018 10:44)  Xarelto 20 mg oral tablet: 1 tab(s) orally once a day (in the evening) (06 Sep 2018 01:53)      MEDICATIONS  (STANDING):  atorvastatin 80 milliGRAM(s) Oral at bedtime  furosemide    Tablet 20 milliGRAM(s) Oral daily  metoprolol succinate  milliGRAM(s) Oral daily  pantoprazole    Tablet 40 milliGRAM(s) Oral before breakfast  rivaroxaban 20 milliGRAM(s) Oral every 24 hours    MEDICATIONS  (PRN):  acetaminophen   Tablet .. 650 milliGRAM(s) Oral every 4 hours PRN Temp greater or equal to 38C (100.4F), Mild Pain (1 - 3)      Allergies    No Known Allergies    Intolerances        Review of systems  General: mild fatigue   Skin: no rash   Ophtalmologic: no visual changes   Respiratory: no shortness of breath   Cardiovascular: no chest pain   Gastrointestinal: as per H&P   Genitourinary: no dysuria   Neurological: no weakness   otherwise as described above     PHYSICAL EXAM:   Vital Signs:  Vital Signs Last 24 Hrs  T(C): 36.7 (10 Sep 2018 14:30), Max: 36.7 (10 Sep 2018 14:30)  T(F): 98 (10 Sep 2018 14:30), Max: 98 (10 Sep 2018 14:30)  HR: 71 (10 Sep 2018 14:30) (71 - 74)  BP: 97/59 (10 Sep 2018 14:30) (97/59 - 110/64)  BP(mean): --  RR: 20 (10 Sep 2018 14:30) (20 - 20)  SpO2: 97% (10 Sep 2018 14:30) (97% - 97%)  Daily     Daily     GENERAL:  no distress  SKIN: intact  HEENT:  NC/AT,  anicteric  CHEST:   no increased effort, breath sounds clear  HEART:  Regular rhythm  ABDOMEN:  Soft, non-tender, non-distended, normoactive bowel sounds,  no masses ,no hepato-splenomegaly, no signs of chronic liver disease  EXTEREMITIES:  no cyanosis      LABS:                        8.7    6.98  )-----------( 244      ( 10 Sep 2018 09:31 )             28.1       Hemoglobin: 8.7 g/dL (09-10-18 @ 09:31)  Hemoglobin: 8.1 g/dL (09-09-18 @ 13:00)  Hemoglobin: 7.6 g/dL (09-08-18 @ 07:29)  Hemoglobin: 7.2 g/dL (09-07-18 @ 22:20)      09-10    136  |  99  |  14  ----------------------------<  168<H>  4.4   |  21  |  1.1    Ca    8.8      10 Sep 2018 09:31  Mg     2.2     09-10    TPro  6.4  /  Alb  4.0  /  TBili  0.6  /  DBili  x   /  AST  33  /  ALT  27  /  AlkPhos  33  09-10          Alanine Aminotransferase (ALT/SGPT): 27 U/L (09-10-18 @ 09:31)  Alkaline Phosphatase, Serum: 33 U/L (09-10-18 @ 09:31)  Aspartate Aminotransferase (AST/SGOT): 33 U/L (09-10-18 @ 09:31)            RADIOLOGY & ADDITIONAL TESTS:
REBEKA MCMAHAN 73y Male  MRN#: [id number]     SUBJECTIVE  Patient is a 73y old Male who presents with a chief complaint of Symptomatic Anemia   Currently admitted to medicine with the primary diagnosis of Symptomatic anemia  Today is hospital day 4d, and this morning he reports no overnight events.     Interval history:  A 73M with pmhx of AAA s/p stent, CAD/MI, Left ventricular thrombus/aneurysm (on Xarelto started 3 weeks ago and plavix was stopped at that time), HTN, sciatica, and HLD presents for 4 days of progressive dyspnea on exertion, pre syncope, and dizziness. Patient reports having dark brown stool last week .He denies any hx of GI bleed in the past, Hematochezia, hematemesis, abdominal pain, dysphagia, odynophagia. Patient does admit to weight loss of 3-4 lbs in the last 3-4 months due slight dietary modification. he also reported frequent daily NSAID usage for sciatic pain but stopped taking them ~1.5 months ago. Last Colonoscopy in 2009 by Dr renee narayanan showed polyp and never had an EGD   In ED: T: 99   HR: 99    BP: 117/59   Hb was found to be 6 (last CBC in 2009 was Hb 14.9), transfused 2 u prbc. Stool was found to be dark brown on exam in ED (no guaiac done)    OBJECTIVE  PAST MEDICAL & SURGICAL HISTORY  Sciatica  HLD (hyperlipidemia)  CAD (coronary artery disease)  AAA (abdominal aortic aneurysm) without rupture  MI (myocardial infarction)  History of appendectomy  History of abdominal aortic aneurysm (AAA) repair    ALLERGIES:  No Known Allergies    MEDICATIONS:  STANDING MEDICATIONS  atorvastatin 80 milliGRAM(s) Oral at bedtime  furosemide    Tablet 20 milliGRAM(s) Oral daily  metoprolol succinate  milliGRAM(s) Oral daily  pantoprazole    Tablet 40 milliGRAM(s) Oral before breakfast  rivaroxaban 20 milliGRAM(s) Oral every 24 hours    PRN MEDICATIONS  acetaminophen   Tablet .. 650 milliGRAM(s) Oral every 4 hours PRN      VITAL SIGNS: Last 24 Hours  T(C): 36.5 (09 Sep 2018 15:43), Max: 36.7 (08 Sep 2018 23:53)  T(F): 97.7 (09 Sep 2018 15:43), Max: 98 (08 Sep 2018 23:53)  HR: 75 (09 Sep 2018 15:43) (74 - 80)  BP: 118/62 (09 Sep 2018 15:43) (101/59 - 118/62)  BP(mean): --  RR: 18 (09 Sep 2018 15:43) (18 - 20)  SpO2: --    LABS:                        8.1    7.82  )-----------( 242      ( 09 Sep 2018 13:00 )             25.7     09-09    137  |  102  |  13  ----------------------------<  107<H>  4.3   |  22  |  1.0    Ca    8.6      09 Sep 2018 13:00  Mg     2.2     09-08      PTT - ( 09 Sep 2018 06:51 )  PTT:87.3 sec    RADIOLOGY:    < from: Transthoracic Echocardiogram (09.06.18 @ 08:36) >  1. Left ventricular ejection fraction, by visual estimation, is 35 to   40%.   2. Spectral Doppler shows impaired relaxation pattern of left   ventricular myocardial filling (Grade I diastolic dysfunction).   3. Mild tricuspid regurgitation.   4. Mildly thickened and calcified aortic leaflets with reduced opening.   Partial fusion of the non-coronary and left cusps.   5. Trace pulmonic valve regurgitation.    < end of copied text >    < from: Xray Chest 1 View- PORTABLE-Urgent (09.05.18 @ 18:53) >  No radiographic evidence of acute cardiopulmonary disease.    < end of copied text >    < from: CT Abdomen and Pelvis w/ IV Cont (09.05.18 @ 21:28) >  . No evidence of acute intra-abdominal pathology.  2. Infrarenal abdominal 4 cm aortic aneurysm, status post aortobiiliac   stent with SMA and bilateral renal artery stenting.      < end of copied text >    < from: CT Head No Cont (09.05.18 @ 17:47) >  No CT evidence for acuteintracranial pathology.     < end of copied text >    PHYSICAL EXAM:    GENERAL: NAD, well-developed, AAOx3  HEENT:  Atraumatic, Normocephalic. EOMI, PERRLA, conjunctiva and sclera clear, No JVD  PULMONARY: Normal breath sounds heard. Clear to auscultation bilaterally; No wheeze  CARDIOVASCULAR: S1,S2 heard. Regular rate and rhythm; MR murmur heard   GASTROINTESTINAL: Soft, Nontender, Nondistended; Bowel sounds present  MUSCULOSKELETAL:  2+ Peripheral Pulses, No clubbing, cyanosis, or edema  NEUROLOGY: non-focal  SKIN: No rashes or lesions      ADMISSION SUMMARY  Patient is a 73y old Male who presents with a chief complaint of Symptomatic Anemia   Currently admitted to medicine with the primary diagnosis of Symptomatic anemia      ASSESSMENT & PLAN  73M with pmhx of AAA s/p stent, CAD/MI, Left ventricular thrombus/aneurysm (on Xarelto), HTN, sciatica, and HLD presents for 4 days of progressive dynspnea on exertion, pre syncope, and dizziness found to have hemoglobin 6.0 s/p transfusion likely due to GIB    #Symptomatic Normocytic Anemia likely due to suspected GIB:   -Patient currently symptomatic  -EGD/colonoscopy did not reveal site of blood except for small black fluid at terminal ileum  - Capsule endoscopy done. f/u results  - PO PPI QD  - Restarted Xarelto as per the attending note    #Coronary Artery Disease /MI   Continue with BB and Statin     #Hypertension:   Continue Metoprolol    #AAA s/p s/p EVAR 2011 (Dr. Leblanc [place]),  -Last seen by Dr. Leblanc 2 wks ago. No endoleak on CTA  -Cleared by vascular for procedures     GI PPx: on ppi ggt  Full Code
SUBJECTIVE:    Patient is a 73y old Male who presents with a chief complaint of Symptomatic Anemia (06 Sep 2018 13:03)    Currently admitted to medicine with the primary diagnosis of Symptomatic anemia     Today is hospital day 2d.     PAST MEDICAL & SURGICAL HISTORY  Sciatica  HLD (hyperlipidemia)  CAD (coronary artery disease)  AAA (abdominal aortic aneurysm) without rupture  MI (myocardial infarction)  History of appendectomy  History of abdominal aortic aneurysm (AAA) repair    SOCIAL HISTORY:  Negative for smoking/alcohol/drug use.     ALLERGIES:  No Known Allergies    MEDICATIONS:  STANDING MEDICATIONS  atorvastatin 80 milliGRAM(s) Oral at bedtime  furosemide    Tablet 20 milliGRAM(s) Oral daily  metoprolol succinate  milliGRAM(s) Oral daily  pantoprazole Infusion 8 mG/Hr IV Continuous <Continuous>  sodium chloride 0.9%. 1000 milliLiter(s) IV Continuous <Continuous>    PRN MEDICATIONS  acetaminophen   Tablet .. 650 milliGRAM(s) Oral every 4 hours PRN    VITALS:   T(F): 98.2  HR: 76  BP: 121/60  RR: 18  SpO2: 99%    LABS:                        8.1    7.68  )-----------( 215      ( 07 Sep 2018 07:38 )             25.2     09-07    143  |  106  |  13  ----------------------------<  94  4.0   |  23  |  1.0    Ca    8.6      07 Sep 2018 07:38  Mg     2.2     09-07    TPro  5.5<L>  /  Alb  3.6  /  TBili  0.7  /  DBili  x   /  AST  15  /  ALT  10  /  AlkPhos  26<L>  09-07    PT/INR - ( 07 Sep 2018 07:38 )   PT: 13.20 sec;   INR: 1.22 ratio         PTT - ( 07 Sep 2018 07:38 )  PTT:26.2 sec          CARDIAC MARKERS ( 06 Sep 2018 09:36 )  x     / <0.01 ng/mL / 126 U/L / x     / 2.6 ng/mL  CARDIAC MARKERS ( 05 Sep 2018 17:14 )  x     / <0.01 ng/mL / x     / x     / x          RADIOLOGY:    PHYSICAL EXAM:  GEN: No acute distress  LUNGS: Clear to auscultation bilaterally   HEART: S1/S2 present. RRR.   ABD/ GI: Soft, non-tender, non-distended. Bowel sounds present  EXT: NC/NC/NE/2+PP/FRENCH  NEURO: AAOX3
24H events:    Patient reported bloody BM today  EGD/colonoscopy didn't reveal site of bleed  Restart AC  Start capsule endoscopy    PAST MEDICAL & SURGICAL HISTORY  Sciatica  HLD (hyperlipidemia)  CAD (coronary artery disease)  AAA (abdominal aortic aneurysm) without rupture  MI (myocardial infarction)  History of appendectomy  History of abdominal aortic aneurysm (AAA) repair    SOCIAL HISTORY:  Negative for smoking/alcohol/drug use.     ALLERGIES:  No Known Allergies    MEDICATIONS:  STANDING MEDICATIONS  atorvastatin 80 milliGRAM(s) Oral at bedtime  furosemide    Tablet 20 milliGRAM(s) Oral daily  metoprolol succinate  milliGRAM(s) Oral daily  pantoprazole  Injectable 40 milliGRAM(s) IV Push two times a day  rivaroxaban 20 milliGRAM(s) Oral every 24 hours    PRN MEDICATIONS  acetaminophen   Tablet .. 650 milliGRAM(s) Oral every 4 hours PRN    VITALS:   T(F): 97.8  HR: 78  BP: 115/58  RR: 18  SpO2: 98%    LABS:                        8.1    7.68  )-----------( 215      ( 07 Sep 2018 07:38 )             25.2     09-07    143  |  106  |  13  ----------------------------<  94  4.0   |  23  |  1.0    Ca    8.6      07 Sep 2018 07:38  Mg     2.2     09-07    TPro  5.5<L>  /  Alb  3.6  /  TBili  0.7  /  DBili  x   /  AST  15  /  ALT  10  /  AlkPhos  26<L>  09-07    PT/INR - ( 07 Sep 2018 07:38 )   PT: 13.20 sec;   INR: 1.22 ratio         PTT - ( 07 Sep 2018 07:38 )  PTT:26.2 sec          CARDIAC MARKERS ( 06 Sep 2018 09:36 )  x     / <0.01 ng/mL / 126 U/L / x     / 2.6 ng/mL  CARDIAC MARKERS ( 05 Sep 2018 17:14 )  x     / <0.01 ng/mL / x     / x     / x          RADIOLOGY:    PHYSICAL EXAM:  GEN: No acute distress  LUNGS: Clear to auscultation bilaterally   HEART:  RRR.   ABD: Soft, non-tender, non-distended.   EXT: No edema  NEURO: AAOX3
Chief Complaint:  Patient is a 73y old  Male who presents with a chief complaint of Symptomatic Anemia (07 Sep 2018 16:01)      Interval Events:     Allergies:  No Known Allergies      Home Medications:    Hospital Medications:  acetaminophen   Tablet .. 650 milliGRAM(s) Oral every 4 hours PRN  atorvastatin 80 milliGRAM(s) Oral at bedtime  furosemide    Tablet 20 milliGRAM(s) Oral daily  heparin  Infusion 1400 Unit(s)/Hr IV Continuous <Continuous>  metoprolol succinate  milliGRAM(s) Oral daily  pantoprazole    Tablet 40 milliGRAM(s) Oral before breakfast      PMHX/PSHX:  Sciatica  HLD (hyperlipidemia)  CAD (coronary artery disease)  AAA (abdominal aortic aneurysm) without rupture  MI (myocardial infarction)  History of appendectomy  History of abdominal aortic aneurysm (AAA) repair      Family history:  Family history of oral cancer (Father)      ROS:   As mentioned below      PHYSICAL EXAM:   Vital Signs:  Vital Signs Last 24 Hrs  T(C): 36.7 (08 Sep 2018 15:48), Max: 36.8 (08 Sep 2018 06:05)  T(F): 98 (08 Sep 2018 15:48), Max: 98.2 (08 Sep 2018 06:05)  HR: 84 (08 Sep 2018 15:48) (82 - 90)  BP: 107/57 (08 Sep 2018 15:48) (107/57 - 123/56)  BP(mean): --  RR: 20 (08 Sep 2018 15:48) (18 - 20)  SpO2: 98% (08 Sep 2018 15:48) (97% - 98%)  Daily Height in cm: 170.18 (08 Sep 2018 06:05)    Daily     GENERAL:  NAD  HEENT:  NC/AT,  No Thyromegaly  CHEST:  CTA B/L  HEART:  S1, S2- No M, R, G  ABDOMEN:  Soft, NT, ND  EXTEREMITIES:  no cyanosis,clubbing or edema  SKIN:  NAD  NEURO:  Grossly Nr.    LABS:                        7.6    7.22  )-----------( 211      ( 08 Sep 2018 07:29 )             24.1     09-08    142  |  105  |  9<L>  ----------------------------<  86  4.0   |  22  |  0.9    Ca    8.4<L>      08 Sep 2018 07:29  Mg     2.2     09-08    TPro  5.5<L>  /  Alb  3.6  /  TBili  0.7  /  DBili  x   /  AST  15  /  ALT  10  /  AlkPhos  26<L>  09-07    LIVER FUNCTIONS - ( 07 Sep 2018 07:38 )  Alb: 3.6 g/dL / Pro: 5.5 g/dL / ALK PHOS: 26 U/L / ALT: 10 U/L / AST: 15 U/L / GGT: x           PT/INR - ( 07 Sep 2018 07:38 )   PT: 13.20 sec;   INR: 1.22 ratio         PTT - ( 08 Sep 2018 16:27 )  PTT:59.6 sec        Imaging:

## 2018-09-10 NOTE — PROGRESS NOTE ADULT - ASSESSMENT
SUBJECTIVE:    Patient is a 73y old Male who presents with a chief complaint of Symptomatic Anemia (09 Sep 2018 18:13)    Currently admitted to medicine with the primary diagnosis of Symptomatic anemia     Today is hospital day 5d. This morning he is resting comfortably in bed and reports no new issues or overnight events.     PAST MEDICAL & SURGICAL HISTORY  Sciatica  HLD (hyperlipidemia)  CAD (coronary artery disease)  AAA (abdominal aortic aneurysm) without rupture  MI (myocardial infarction)  History of appendectomy  History of abdominal aortic aneurysm (AAA) repair    SOCIAL HISTORY:  Negative for smoking/alcohol/drug use.     ALLERGIES:  No Known Allergies    MEDICATIONS:  STANDING MEDICATIONS  atorvastatin 80 milliGRAM(s) Oral at bedtime  furosemide    Tablet 20 milliGRAM(s) Oral daily  metoprolol succinate  milliGRAM(s) Oral daily  pantoprazole    Tablet 40 milliGRAM(s) Oral before breakfast  rivaroxaban 20 milliGRAM(s) Oral every 24 hours    PRN MEDICATIONS  acetaminophen   Tablet .. 650 milliGRAM(s) Oral every 4 hours PRN    VITALS:   T(F): 97.7  HR: 75  BP: 118/62  RR: 18  SpO2: --    LABS:                        8.1    7.82  )-----------( 242      ( 09 Sep 2018 13:00 )             25.7     09-09    137  |  102  |  13  ----------------------------<  107<H>  4.3   |  22  |  1.0    Ca    8.6      09 Sep 2018 13:00  Mg     2.2     09-08      PTT - ( 09 Sep 2018 06:51 )  PTT:87.3 sec              RADIOLOGY:    PHYSICAL EXAM:   GEN: NAD  Pulmonary:  CV:  GI:  MSK:  Neuro:  Skin:  Intravenous access:   NG tube:   Pablo Catheter:     73M with pmhx of AAA s/p stent, CAD/MI, Left ventricular thrombus/aneurysm (on Xarelto since 3 months ago), presents for 4 days of progressive dynspnea on exertion, pre syncope, and dizziness found to have hemoglobin 6.0 s/p transfusion likely due to GIB    #symptomatic normocytic anemia secondary to GI bleed   -Patient complains of weakness and dyspnea  -s/p 2 U PRBCs 9/6, Hb 8.1  -EGD/colonoscopy did not reveal site of blood except for small black fluid at terminal ileum  - Capsule endoscopy done. f/u results  - PO PPI QD  -keep active type and screen  -Critical care following.  - % Saturation, Iron: 14 %, Total Binding Capacity.: 326 ug/dL, Iron Total, Serum: 45 ug/dL  #LV thrombus  - Xarelto restarted yesterday, pt of heparin drip    #Coronary Artery Disease /MI   Continue with metoprolol and Statin     #Hypertension:   Continue Metoprolol    #AAA s/p s/p EVAR 2011 (Dr. Leblanc [place]),  -Last seen by Dr. Leblanc 2 wks ago. No endoleak on CTA 9/5  -Cleared by vascular for procedures  -will need OP vascular follow up   #GI PPx: on ppi ggt  #Full Code SUBJECTIVE:    Patient is a 73y old Male who presents with a chief complaint of Symptomatic Anemia (09 Sep 2018 18:13)    Currently admitted to medicine with the primary diagnosis of Symptomatic anemia     Today is hospital day 5d. This morning he is resting comfortably in bed and reports no new issues or overnight events.     PAST MEDICAL & SURGICAL HISTORY  Sciatica  HLD (hyperlipidemia)  CAD (coronary artery disease)  AAA (abdominal aortic aneurysm) without rupture  MI (myocardial infarction)  History of appendectomy  History of abdominal aortic aneurysm (AAA) repair    SOCIAL HISTORY:  Negative for smoking/alcohol/drug use.     ALLERGIES:  No Known Allergies    MEDICATIONS:  STANDING MEDICATIONS  atorvastatin 80 milliGRAM(s) Oral at bedtime  furosemide    Tablet 20 milliGRAM(s) Oral daily  metoprolol succinate  milliGRAM(s) Oral daily  pantoprazole    Tablet 40 milliGRAM(s) Oral before breakfast  rivaroxaban 20 milliGRAM(s) Oral every 24 hours    PRN MEDICATIONS  acetaminophen   Tablet .. 650 milliGRAM(s) Oral every 4 hours PRN    VITALS:   T(F): 97.7  HR: 75  BP: 118/62  RR: 18  SpO2: --    LABS:                        8.1    7.82  )-----------( 242      ( 09 Sep 2018 13:00 )             25.7     09-09    137  |  102  |  13  ----------------------------<  107<H>  4.3   |  22  |  1.0    Ca    8.6      09 Sep 2018 13:00  Mg     2.2     09-08      PTT - ( 09 Sep 2018 06:51 )  PTT:87.3 sec              RADIOLOGY:    PHYSICAL EXAM:   GEN: NAD, walking around room  Pulmonary: Clear to auscultation bilaterally  CV: Regular rate and rhythm  GI: No tenderness, BS+ abdomen soft  MSK: Full ROM bilaterally  Neuro: AAOx3  Skin: moist mm    Intravenous access:   NG tube:   Pablo Catheter:     73M with pmhx of AAA s/p stent, CAD/MI, Left ventricular thrombus/aneurysm (on Xarelto since 3 months ago), presents for 4 days of progressive dynspnea on exertion, pre syncope, and dizziness found to have hemoglobin 6.0 s/p transfusion likely due to GIB    #symptomatic normocytic anemia secondary to GI bleed   -Patient complains of weakness and dyspnea  -s/p 2 U PRBCs 9/6, Hb 8.1  -EGD/colonoscopy did not reveal site of blood except for small black fluid at terminal ileum  - Capsule endoscopy done. f/u results  - PO PPI QD  -keep active type and screen  -Critical care following.  - % Saturation, Iron: 14 %, Total Binding Capacity.: 326 ug/dL, Iron Total, Serum: 45 ug/dL  #LV thrombus  - Xarelto restarted yesterday, pt of heparin drip    #Coronary Artery Disease /MI   Continue with metoprolol and Statin     #Hypertension:   Continue Metoprolol    #AAA s/p s/p EVAR 2011 (Dr. Leblanc [place]),  -Last seen by Dr. Leblanc 2 wks ago. No endoleak on CTA 9/5  -Cleared by vascular for procedures  -will need OP vascular follow up   #GI PPx: on ppi ggt  #Full Code

## 2018-09-10 NOTE — DISCHARGE NOTE ADULT - CARE PLAN
Principal Discharge DX:	Symptomatic anemia  Goal:	medically optimized Principal Discharge DX:	Symptomatic anemia  Goal:	medically optimized and patient stablized to be discharged home  Assessment and plan of treatment:	Gastroenterology workup  negative including EGD/colonoscopy did not reveal site of blood except for small black fluid at terminal ileum .Capsule endoscopy reported negative  patient to f/u Gastroenterology and PMD as an outpatient Principal Discharge DX:	Symptomatic anemia  Goal:	medically optimized and patient stablized to be discharged home  Assessment and plan of treatment:	Gastroenterology workup  negative including EGD/colonoscopy did not reveal site of blood except for small black fluid at terminal ileum. Capsule endoscopy to be followed up as an outpatient. Patient to f/u Gastroenterology and PMD as an outpatient  Secondary Diagnosis:	LV (left ventricular) mural thrombus  Goal:	Continue medical management.  Assessment and plan of treatment:	Continue with Xarelto, and follow up with Dr. Cruz this Thursday at 11:45

## 2018-09-10 NOTE — PROGRESS NOTE ADULT - PROVIDER SPECIALTY LIST ADULT
Gastroenterology
Hospitalist
Internal Medicine

## 2018-09-10 NOTE — DISCHARGE NOTE ADULT - ADDITIONAL INSTRUCTIONS
Follow up with Dr. Savage the gastroenterologist within 1 week of discharge. Follow up with primary care doctor and your cardiologist within 1 week of discharge.

## 2018-09-10 NOTE — DISCHARGE NOTE ADULT - CARE PROVIDERS DIRECT ADDRESSES
,DirectAddress_Unknown,[email or web address],DirectAddress_Unknown

## 2018-09-10 NOTE — PROGRESS NOTE ADULT - ASSESSMENT
A 73M with pmhx of AAA s/p stent, CAD/MI, Left ventricular thrombus/aneurysm (on Xarelto started 3 weeks ago and plavix was stopped atthattime), HTN, sciatica, and HLD presents for 4 days of progressive dyspnea on exertion, pre syncope, and dizziness. Patient reports having dark brown stool last week .He denies any hx of GI bleed in the past, Hematochezia, hematemesis, abdominal pain, dysphagia, odynophagia.Patient does admit to weight loss of 3-4 lbs in the last 3-4 months due slight dietary modification. he also  reported frequent daily NSAID usage for sciatic pain but stopped taking them ~1.5 months ago.   Last Colonoscopy in 2009 by Dr renee narayanan showed polyp and never had an EGD       #Symptomatic anemia/No signs of active GI bleeding( Patient may have upper GI  bleed before then now resolved)  s/p EGD/ colonoscopy unrevealing   s/p VCE   no signs of GI bleed despite a/c   o/p follow up

## 2018-09-10 NOTE — DISCHARGE NOTE ADULT - MEDICATION SUMMARY - MEDICATIONS TO STOP TAKING
I will STOP taking the medications listed below when I get home from the hospital:  None I will STOP taking the medications listed below when I get home from the hospital:    famotidine 20 mg oral tablet  -- 1 tab(s) orally

## 2018-09-10 NOTE — DISCHARGE NOTE ADULT - MEDICATION SUMMARY - MEDICATIONS TO TAKE
I will START or STAY ON the medications listed below when I get home from the hospital:    Xarelto 20 mg oral tablet  -- 1 tab(s) by mouth once a day (in the evening)  -- Indication: For Left ventricle thrombus    Crestor 40 mg oral tablet  -- 1 tab(s) by mouth once a day (at bedtime)  -- Indication: For Hyperlipidemia    Metoprolol Succinate  mg oral tablet, extended release  -- 1 tab(s) by mouth once a day  -- Indication: For CAD (coronary artery disease)    furosemide 20 mg oral tablet  -- 1 tab(s) by mouth once a day  -- Indication: For CONGESTIVE HEART FAILURE    famotidine 20 mg oral tablet  -- 1 tab(s) orally  -- Indication: For Gerd I will START or STAY ON the medications listed below when I get home from the hospital:    Xarelto 20 mg oral tablet  -- 1 tab(s) by mouth once a day (in the evening)  -- Indication: For Left ventricle thrombus    Crestor 40 mg oral tablet  -- 1 tab(s) by mouth once a day (at bedtime)  -- Indication: For Hyperlipidemia    Metoprolol Succinate  mg oral tablet, extended release  -- 1 tab(s) by mouth once a day  -- Indication: For CAD (coronary artery disease)    furosemide 20 mg oral tablet  -- 1 tab(s) by mouth once a day  -- Indication: For CONGESTIVE HEART FAILURE    pantoprazole 40 mg oral delayed release tablet  -- 1 tab(s) by mouth once a day (before a meal)  -- Indication: For GI protection

## 2018-09-11 LAB — SURGICAL PATHOLOGY STUDY: SIGNIFICANT CHANGE UP

## 2018-09-12 PROBLEM — I71.4 ABDOMINAL AORTIC ANEURYSM, WITHOUT RUPTURE: Chronic | Status: ACTIVE | Noted: 2018-09-05

## 2018-09-12 PROBLEM — M54.30 SCIATICA, UNSPECIFIED SIDE: Chronic | Status: ACTIVE | Noted: 2018-09-06

## 2018-09-12 PROBLEM — E78.5 HYPERLIPIDEMIA, UNSPECIFIED: Chronic | Status: ACTIVE | Noted: 2018-09-05

## 2018-09-12 PROBLEM — I21.9 ACUTE MYOCARDIAL INFARCTION, UNSPECIFIED: Chronic | Status: ACTIVE | Noted: 2018-09-05

## 2018-09-12 PROBLEM — I25.10 ATHEROSCLEROTIC HEART DISEASE OF NATIVE CORONARY ARTERY WITHOUT ANGINA PECTORIS: Chronic | Status: ACTIVE | Noted: 2018-09-05

## 2018-09-14 ENCOUNTER — OUTPATIENT (OUTPATIENT)
Dept: OUTPATIENT SERVICES | Facility: HOSPITAL | Age: 73
LOS: 1 days | Discharge: HOME | End: 2018-09-14

## 2018-09-14 ENCOUNTER — APPOINTMENT (OUTPATIENT)
Dept: GASTROENTEROLOGY | Facility: CLINIC | Age: 73
End: 2018-09-14

## 2018-09-14 DIAGNOSIS — Z90.49 ACQUIRED ABSENCE OF OTHER SPECIFIED PARTS OF DIGESTIVE TRACT: Chronic | ICD-10-CM

## 2018-09-14 DIAGNOSIS — Z98.890 OTHER SPECIFIED POSTPROCEDURAL STATES: Chronic | ICD-10-CM

## 2018-09-14 DIAGNOSIS — Z79.01 LONG TERM (CURRENT) USE OF ANTICOAGULANTS: ICD-10-CM

## 2018-09-14 DIAGNOSIS — I48.91 UNSPECIFIED ATRIAL FIBRILLATION: ICD-10-CM

## 2018-09-17 ENCOUNTER — OUTPATIENT (OUTPATIENT)
Dept: OUTPATIENT SERVICES | Facility: HOSPITAL | Age: 73
LOS: 1 days | Discharge: HOME | End: 2018-09-17

## 2018-09-17 DIAGNOSIS — Z98.890 OTHER SPECIFIED POSTPROCEDURAL STATES: Chronic | ICD-10-CM

## 2018-09-17 DIAGNOSIS — Z90.49 ACQUIRED ABSENCE OF OTHER SPECIFIED PARTS OF DIGESTIVE TRACT: Chronic | ICD-10-CM

## 2018-09-17 DIAGNOSIS — Z00.00 ENCOUNTER FOR GENERAL ADULT MEDICAL EXAMINATION WITHOUT ABNORMAL FINDINGS: ICD-10-CM

## 2018-09-17 DIAGNOSIS — Z79.01 LONG TERM (CURRENT) USE OF ANTICOAGULANTS: ICD-10-CM

## 2018-09-17 DIAGNOSIS — I48.91 UNSPECIFIED ATRIAL FIBRILLATION: ICD-10-CM

## 2018-09-18 DIAGNOSIS — I25.2 OLD MYOCARDIAL INFARCTION: ICD-10-CM

## 2018-09-18 DIAGNOSIS — M54.30 SCIATICA, UNSPECIFIED SIDE: ICD-10-CM

## 2018-09-18 DIAGNOSIS — K92.2 GASTROINTESTINAL HEMORRHAGE, UNSPECIFIED: ICD-10-CM

## 2018-09-18 DIAGNOSIS — Z98.890 OTHER SPECIFIED POSTPROCEDURAL STATES: ICD-10-CM

## 2018-09-18 DIAGNOSIS — Z53.29 PROCEDURE AND TREATMENT NOT CARRIED OUT BECAUSE OF PATIENT'S DECISION FOR OTHER REASONS: ICD-10-CM

## 2018-09-18 DIAGNOSIS — K29.70 GASTRITIS, UNSPECIFIED, WITHOUT BLEEDING: ICD-10-CM

## 2018-09-18 DIAGNOSIS — D64.9 ANEMIA, UNSPECIFIED: ICD-10-CM

## 2018-09-18 DIAGNOSIS — I25.10 ATHEROSCLEROTIC HEART DISEASE OF NATIVE CORONARY ARTERY WITHOUT ANGINA PECTORIS: ICD-10-CM

## 2018-09-18 DIAGNOSIS — Z79.01 LONG TERM (CURRENT) USE OF ANTICOAGULANTS: ICD-10-CM

## 2018-09-18 DIAGNOSIS — I51.3 INTRACARDIAC THROMBOSIS, NOT ELSEWHERE CLASSIFIED: ICD-10-CM

## 2018-09-18 DIAGNOSIS — E78.5 HYPERLIPIDEMIA, UNSPECIFIED: ICD-10-CM

## 2018-09-18 DIAGNOSIS — Z96.9 PRESENCE OF FUNCTIONAL IMPLANT, UNSPECIFIED: ICD-10-CM

## 2018-09-18 DIAGNOSIS — I10 ESSENTIAL (PRIMARY) HYPERTENSION: ICD-10-CM

## 2018-09-18 DIAGNOSIS — K62.1 RECTAL POLYP: ICD-10-CM

## 2018-09-18 DIAGNOSIS — K62.89 OTHER SPECIFIED DISEASES OF ANUS AND RECTUM: ICD-10-CM

## 2018-09-19 ENCOUNTER — OUTPATIENT (OUTPATIENT)
Dept: OUTPATIENT SERVICES | Facility: HOSPITAL | Age: 73
LOS: 1 days | Discharge: HOME | End: 2018-09-19

## 2018-09-19 DIAGNOSIS — Z98.890 OTHER SPECIFIED POSTPROCEDURAL STATES: Chronic | ICD-10-CM

## 2018-09-19 DIAGNOSIS — Z90.49 ACQUIRED ABSENCE OF OTHER SPECIFIED PARTS OF DIGESTIVE TRACT: Chronic | ICD-10-CM

## 2018-09-19 DIAGNOSIS — Z79.01 LONG TERM (CURRENT) USE OF ANTICOAGULANTS: ICD-10-CM

## 2018-09-19 DIAGNOSIS — I48.91 UNSPECIFIED ATRIAL FIBRILLATION: ICD-10-CM

## 2018-09-19 LAB
POCT INR: 1.2 RATIO — SIGNIFICANT CHANGE UP (ref 0.9–1.2)
POCT PT: 13.9 SEC — HIGH (ref 10–13.4)

## 2018-09-21 ENCOUNTER — OUTPATIENT (OUTPATIENT)
Dept: OUTPATIENT SERVICES | Facility: HOSPITAL | Age: 73
LOS: 1 days | Discharge: HOME | End: 2018-09-21

## 2018-09-21 DIAGNOSIS — I48.91 UNSPECIFIED ATRIAL FIBRILLATION: ICD-10-CM

## 2018-09-21 DIAGNOSIS — Z90.49 ACQUIRED ABSENCE OF OTHER SPECIFIED PARTS OF DIGESTIVE TRACT: Chronic | ICD-10-CM

## 2018-09-21 DIAGNOSIS — Z98.890 OTHER SPECIFIED POSTPROCEDURAL STATES: Chronic | ICD-10-CM

## 2018-09-21 DIAGNOSIS — Z79.01 LONG TERM (CURRENT) USE OF ANTICOAGULANTS: ICD-10-CM

## 2018-09-21 LAB
POCT INR: 1.1 RATIO — SIGNIFICANT CHANGE UP (ref 0.9–1.2)
POCT PT: 13 SEC — SIGNIFICANT CHANGE UP (ref 10–13.4)

## 2018-09-24 ENCOUNTER — OUTPATIENT (OUTPATIENT)
Dept: OUTPATIENT SERVICES | Facility: HOSPITAL | Age: 73
LOS: 1 days | Discharge: HOME | End: 2018-09-24

## 2018-09-24 DIAGNOSIS — Z90.49 ACQUIRED ABSENCE OF OTHER SPECIFIED PARTS OF DIGESTIVE TRACT: Chronic | ICD-10-CM

## 2018-09-24 DIAGNOSIS — I48.91 UNSPECIFIED ATRIAL FIBRILLATION: ICD-10-CM

## 2018-09-24 DIAGNOSIS — Z98.890 OTHER SPECIFIED POSTPROCEDURAL STATES: Chronic | ICD-10-CM

## 2018-09-24 DIAGNOSIS — Z79.01 LONG TERM (CURRENT) USE OF ANTICOAGULANTS: ICD-10-CM

## 2018-09-24 LAB
POCT INR: 1.5 RATIO — HIGH (ref 0.9–1.2)
POCT PT: 17.8 SEC — HIGH (ref 10–13.4)

## 2018-09-27 ENCOUNTER — OUTPATIENT (OUTPATIENT)
Dept: OUTPATIENT SERVICES | Facility: HOSPITAL | Age: 73
LOS: 1 days | Discharge: HOME | End: 2018-09-27

## 2018-09-27 DIAGNOSIS — I48.91 UNSPECIFIED ATRIAL FIBRILLATION: ICD-10-CM

## 2018-09-27 DIAGNOSIS — Z98.890 OTHER SPECIFIED POSTPROCEDURAL STATES: Chronic | ICD-10-CM

## 2018-09-27 DIAGNOSIS — Z79.01 LONG TERM (CURRENT) USE OF ANTICOAGULANTS: ICD-10-CM

## 2018-09-27 DIAGNOSIS — Z90.49 ACQUIRED ABSENCE OF OTHER SPECIFIED PARTS OF DIGESTIVE TRACT: Chronic | ICD-10-CM

## 2018-09-27 LAB
POCT INR: 1.4 RATIO — HIGH (ref 0.9–1.2)
POCT PT: 16.5 SEC — HIGH (ref 10–13.4)

## 2018-10-01 ENCOUNTER — OUTPATIENT (OUTPATIENT)
Dept: OUTPATIENT SERVICES | Facility: HOSPITAL | Age: 73
LOS: 1 days | Discharge: HOME | End: 2018-10-01

## 2018-10-01 DIAGNOSIS — Z90.49 ACQUIRED ABSENCE OF OTHER SPECIFIED PARTS OF DIGESTIVE TRACT: Chronic | ICD-10-CM

## 2018-10-01 DIAGNOSIS — Z79.01 LONG TERM (CURRENT) USE OF ANTICOAGULANTS: ICD-10-CM

## 2018-10-01 DIAGNOSIS — Z98.890 OTHER SPECIFIED POSTPROCEDURAL STATES: Chronic | ICD-10-CM

## 2018-10-01 DIAGNOSIS — I48.91 UNSPECIFIED ATRIAL FIBRILLATION: ICD-10-CM

## 2018-10-01 LAB
POCT INR: 2.1 RATIO — HIGH (ref 0.9–1.2)
POCT PT: 24.7 SEC — HIGH (ref 10–13.4)

## 2018-10-05 ENCOUNTER — OUTPATIENT (OUTPATIENT)
Dept: OUTPATIENT SERVICES | Facility: HOSPITAL | Age: 73
LOS: 1 days | Discharge: HOME | End: 2018-10-05

## 2018-10-05 DIAGNOSIS — D50.9 IRON DEFICIENCY ANEMIA, UNSPECIFIED: ICD-10-CM

## 2018-10-05 DIAGNOSIS — Z79.01 LONG TERM (CURRENT) USE OF ANTICOAGULANTS: ICD-10-CM

## 2018-10-05 DIAGNOSIS — I48.91 UNSPECIFIED ATRIAL FIBRILLATION: ICD-10-CM

## 2018-10-05 DIAGNOSIS — Z90.49 ACQUIRED ABSENCE OF OTHER SPECIFIED PARTS OF DIGESTIVE TRACT: Chronic | ICD-10-CM

## 2018-10-05 DIAGNOSIS — Z98.890 OTHER SPECIFIED POSTPROCEDURAL STATES: Chronic | ICD-10-CM

## 2018-10-05 LAB
POCT INR: 1.8 RATIO — HIGH (ref 0.9–1.2)
POCT PT: 21 SEC — HIGH (ref 10–13.4)

## 2018-10-12 ENCOUNTER — OUTPATIENT (OUTPATIENT)
Dept: OUTPATIENT SERVICES | Facility: HOSPITAL | Age: 73
LOS: 1 days | Discharge: HOME | End: 2018-10-12

## 2018-10-12 DIAGNOSIS — Z90.49 ACQUIRED ABSENCE OF OTHER SPECIFIED PARTS OF DIGESTIVE TRACT: Chronic | ICD-10-CM

## 2018-10-12 DIAGNOSIS — Z98.890 OTHER SPECIFIED POSTPROCEDURAL STATES: Chronic | ICD-10-CM

## 2018-10-12 DIAGNOSIS — Z79.01 LONG TERM (CURRENT) USE OF ANTICOAGULANTS: ICD-10-CM

## 2018-10-12 DIAGNOSIS — I48.91 UNSPECIFIED ATRIAL FIBRILLATION: ICD-10-CM

## 2018-10-12 LAB
POCT INR: 2.4 RATIO — HIGH (ref 0.9–1.2)
POCT PT: 29.2 SEC — HIGH (ref 10–13.4)

## 2018-10-19 ENCOUNTER — OUTPATIENT (OUTPATIENT)
Dept: OUTPATIENT SERVICES | Facility: HOSPITAL | Age: 73
LOS: 1 days | Discharge: HOME | End: 2018-10-19

## 2018-10-19 DIAGNOSIS — Z01.810 ENCOUNTER FOR PREPROCEDURAL CARDIOVASCULAR EXAMINATION: ICD-10-CM

## 2018-10-19 DIAGNOSIS — I48.91 UNSPECIFIED ATRIAL FIBRILLATION: ICD-10-CM

## 2018-10-19 DIAGNOSIS — Z90.49 ACQUIRED ABSENCE OF OTHER SPECIFIED PARTS OF DIGESTIVE TRACT: Chronic | ICD-10-CM

## 2018-10-19 DIAGNOSIS — Z79.899 OTHER LONG TERM (CURRENT) DRUG THERAPY: ICD-10-CM

## 2018-10-19 DIAGNOSIS — I25.10 ATHEROSCLEROTIC HEART DISEASE OF NATIVE CORONARY ARTERY WITHOUT ANGINA PECTORIS: ICD-10-CM

## 2018-10-19 DIAGNOSIS — Z98.890 OTHER SPECIFIED POSTPROCEDURAL STATES: Chronic | ICD-10-CM

## 2018-10-19 DIAGNOSIS — Z79.01 LONG TERM (CURRENT) USE OF ANTICOAGULANTS: ICD-10-CM

## 2018-10-19 DIAGNOSIS — D50.9 IRON DEFICIENCY ANEMIA, UNSPECIFIED: ICD-10-CM

## 2018-10-19 LAB
POCT INR: 2.8 RATIO — HIGH (ref 0.9–1.2)
POCT PT: 34.2 SEC — HIGH (ref 10–13.4)

## 2018-10-26 ENCOUNTER — OUTPATIENT (OUTPATIENT)
Dept: OUTPATIENT SERVICES | Facility: HOSPITAL | Age: 73
LOS: 1 days | Discharge: HOME | End: 2018-10-26

## 2018-10-26 DIAGNOSIS — I48.91 UNSPECIFIED ATRIAL FIBRILLATION: ICD-10-CM

## 2018-10-26 DIAGNOSIS — Z79.01 LONG TERM (CURRENT) USE OF ANTICOAGULANTS: ICD-10-CM

## 2018-10-26 DIAGNOSIS — Z98.890 OTHER SPECIFIED POSTPROCEDURAL STATES: Chronic | ICD-10-CM

## 2018-10-26 DIAGNOSIS — Z90.49 ACQUIRED ABSENCE OF OTHER SPECIFIED PARTS OF DIGESTIVE TRACT: Chronic | ICD-10-CM

## 2018-10-26 LAB
POCT INR: 2.2 RATIO — HIGH (ref 0.9–1.2)
POCT PT: 26.3 SEC — HIGH (ref 10–13.4)

## 2018-11-02 ENCOUNTER — OUTPATIENT (OUTPATIENT)
Dept: OUTPATIENT SERVICES | Facility: HOSPITAL | Age: 73
LOS: 1 days | Discharge: HOME | End: 2018-11-02

## 2018-11-02 DIAGNOSIS — I48.91 UNSPECIFIED ATRIAL FIBRILLATION: ICD-10-CM

## 2018-11-02 DIAGNOSIS — D64.9 ANEMIA, UNSPECIFIED: ICD-10-CM

## 2018-11-02 DIAGNOSIS — Z90.49 ACQUIRED ABSENCE OF OTHER SPECIFIED PARTS OF DIGESTIVE TRACT: Chronic | ICD-10-CM

## 2018-11-02 DIAGNOSIS — Z98.890 OTHER SPECIFIED POSTPROCEDURAL STATES: Chronic | ICD-10-CM

## 2018-11-02 DIAGNOSIS — Z79.01 LONG TERM (CURRENT) USE OF ANTICOAGULANTS: ICD-10-CM

## 2018-11-02 LAB
POCT INR: 2.3 RATIO — HIGH (ref 0.9–1.2)
POCT PT: 27.7 SEC — HIGH (ref 10–13.4)

## 2018-11-16 ENCOUNTER — OUTPATIENT (OUTPATIENT)
Dept: OUTPATIENT SERVICES | Facility: HOSPITAL | Age: 73
LOS: 1 days | Discharge: HOME | End: 2018-11-16

## 2018-11-16 DIAGNOSIS — Z90.49 ACQUIRED ABSENCE OF OTHER SPECIFIED PARTS OF DIGESTIVE TRACT: Chronic | ICD-10-CM

## 2018-11-16 DIAGNOSIS — Z98.890 OTHER SPECIFIED POSTPROCEDURAL STATES: Chronic | ICD-10-CM

## 2018-11-16 DIAGNOSIS — D64.9 ANEMIA, UNSPECIFIED: ICD-10-CM

## 2018-11-16 DIAGNOSIS — I48.91 UNSPECIFIED ATRIAL FIBRILLATION: ICD-10-CM

## 2018-11-16 DIAGNOSIS — Z79.01 LONG TERM (CURRENT) USE OF ANTICOAGULANTS: ICD-10-CM

## 2018-11-16 LAB
POCT INR: 2.2 RATIO — HIGH (ref 0.9–1.2)
POCT PT: 26.6 SEC — HIGH (ref 10–13.4)

## 2018-11-30 ENCOUNTER — OUTPATIENT (OUTPATIENT)
Dept: OUTPATIENT SERVICES | Facility: HOSPITAL | Age: 73
LOS: 1 days | Discharge: HOME | End: 2018-11-30

## 2018-11-30 DIAGNOSIS — I48.91 UNSPECIFIED ATRIAL FIBRILLATION: ICD-10-CM

## 2018-11-30 DIAGNOSIS — D64.9 ANEMIA, UNSPECIFIED: ICD-10-CM

## 2018-11-30 DIAGNOSIS — Z98.890 OTHER SPECIFIED POSTPROCEDURAL STATES: Chronic | ICD-10-CM

## 2018-11-30 DIAGNOSIS — Z79.01 LONG TERM (CURRENT) USE OF ANTICOAGULANTS: ICD-10-CM

## 2018-11-30 DIAGNOSIS — Z90.49 ACQUIRED ABSENCE OF OTHER SPECIFIED PARTS OF DIGESTIVE TRACT: Chronic | ICD-10-CM

## 2018-11-30 LAB
POCT INR: 2.5 RATIO — HIGH (ref 0.9–1.2)
POCT PT: 30 SEC — HIGH (ref 10–13.4)

## 2018-12-05 ENCOUNTER — OUTPATIENT (OUTPATIENT)
Dept: OUTPATIENT SERVICES | Facility: HOSPITAL | Age: 73
LOS: 1 days | Discharge: HOME | End: 2018-12-05

## 2018-12-05 DIAGNOSIS — Z90.49 ACQUIRED ABSENCE OF OTHER SPECIFIED PARTS OF DIGESTIVE TRACT: Chronic | ICD-10-CM

## 2018-12-05 DIAGNOSIS — Z98.890 OTHER SPECIFIED POSTPROCEDURAL STATES: Chronic | ICD-10-CM

## 2018-12-05 DIAGNOSIS — I48.91 UNSPECIFIED ATRIAL FIBRILLATION: ICD-10-CM

## 2018-12-05 DIAGNOSIS — Z79.01 LONG TERM (CURRENT) USE OF ANTICOAGULANTS: ICD-10-CM

## 2018-12-11 ENCOUNTER — OUTPATIENT (OUTPATIENT)
Dept: OUTPATIENT SERVICES | Facility: HOSPITAL | Age: 73
LOS: 1 days | Discharge: HOME | End: 2018-12-11

## 2018-12-11 DIAGNOSIS — I48.91 UNSPECIFIED ATRIAL FIBRILLATION: ICD-10-CM

## 2018-12-11 DIAGNOSIS — Z90.49 ACQUIRED ABSENCE OF OTHER SPECIFIED PARTS OF DIGESTIVE TRACT: Chronic | ICD-10-CM

## 2018-12-11 DIAGNOSIS — Z98.890 OTHER SPECIFIED POSTPROCEDURAL STATES: Chronic | ICD-10-CM

## 2018-12-11 DIAGNOSIS — Z79.01 LONG TERM (CURRENT) USE OF ANTICOAGULANTS: ICD-10-CM

## 2018-12-11 LAB
POCT INR: 1.4 RATIO — HIGH (ref 0.9–1.2)
POCT PT: 16.4 SEC — HIGH (ref 10–13.4)

## 2018-12-18 ENCOUNTER — OUTPATIENT (OUTPATIENT)
Dept: OUTPATIENT SERVICES | Facility: HOSPITAL | Age: 73
LOS: 1 days | Discharge: HOME | End: 2018-12-18

## 2018-12-18 DIAGNOSIS — I48.91 UNSPECIFIED ATRIAL FIBRILLATION: ICD-10-CM

## 2018-12-18 DIAGNOSIS — Z98.890 OTHER SPECIFIED POSTPROCEDURAL STATES: Chronic | ICD-10-CM

## 2018-12-18 DIAGNOSIS — Z79.01 LONG TERM (CURRENT) USE OF ANTICOAGULANTS: ICD-10-CM

## 2018-12-18 DIAGNOSIS — Z90.49 ACQUIRED ABSENCE OF OTHER SPECIFIED PARTS OF DIGESTIVE TRACT: Chronic | ICD-10-CM

## 2018-12-18 LAB
POCT INR: 2.1 RATIO — HIGH (ref 0.9–1.2)
POCT PT: 24.9 SEC — HIGH (ref 10–13.4)

## 2019-01-02 ENCOUNTER — OUTPATIENT (OUTPATIENT)
Dept: OUTPATIENT SERVICES | Facility: HOSPITAL | Age: 74
LOS: 1 days | Discharge: HOME | End: 2019-01-02

## 2019-01-02 DIAGNOSIS — I48.91 UNSPECIFIED ATRIAL FIBRILLATION: ICD-10-CM

## 2019-01-02 DIAGNOSIS — Z90.49 ACQUIRED ABSENCE OF OTHER SPECIFIED PARTS OF DIGESTIVE TRACT: Chronic | ICD-10-CM

## 2019-01-02 DIAGNOSIS — Z98.890 OTHER SPECIFIED POSTPROCEDURAL STATES: Chronic | ICD-10-CM

## 2019-01-02 DIAGNOSIS — D64.9 ANEMIA, UNSPECIFIED: ICD-10-CM

## 2019-01-02 DIAGNOSIS — Z79.01 LONG TERM (CURRENT) USE OF ANTICOAGULANTS: ICD-10-CM

## 2019-01-02 LAB
POCT INR: 2.4 RATIO — HIGH (ref 0.9–1.2)
POCT PT: 28.6 SEC — HIGH (ref 10–13.4)

## 2019-01-16 ENCOUNTER — OUTPATIENT (OUTPATIENT)
Dept: OUTPATIENT SERVICES | Facility: HOSPITAL | Age: 74
LOS: 1 days | Discharge: HOME | End: 2019-01-16

## 2019-01-16 DIAGNOSIS — Z98.890 OTHER SPECIFIED POSTPROCEDURAL STATES: Chronic | ICD-10-CM

## 2019-01-16 DIAGNOSIS — Z90.49 ACQUIRED ABSENCE OF OTHER SPECIFIED PARTS OF DIGESTIVE TRACT: Chronic | ICD-10-CM

## 2019-01-16 DIAGNOSIS — Z79.01 LONG TERM (CURRENT) USE OF ANTICOAGULANTS: ICD-10-CM

## 2019-01-16 DIAGNOSIS — I48.91 UNSPECIFIED ATRIAL FIBRILLATION: ICD-10-CM

## 2019-01-16 LAB
POCT INR: 2 RATIO — HIGH (ref 0.9–1.2)
POCT PT: 24 SEC — HIGH (ref 10–13.4)

## 2019-01-30 ENCOUNTER — OUTPATIENT (OUTPATIENT)
Dept: OUTPATIENT SERVICES | Facility: HOSPITAL | Age: 74
LOS: 1 days | Discharge: HOME | End: 2019-01-30

## 2019-01-30 DIAGNOSIS — Z98.890 OTHER SPECIFIED POSTPROCEDURAL STATES: Chronic | ICD-10-CM

## 2019-01-30 DIAGNOSIS — Z90.49 ACQUIRED ABSENCE OF OTHER SPECIFIED PARTS OF DIGESTIVE TRACT: Chronic | ICD-10-CM

## 2019-01-30 DIAGNOSIS — Z79.01 LONG TERM (CURRENT) USE OF ANTICOAGULANTS: ICD-10-CM

## 2019-01-30 DIAGNOSIS — I48.91 UNSPECIFIED ATRIAL FIBRILLATION: ICD-10-CM

## 2019-02-19 ENCOUNTER — OUTPATIENT (OUTPATIENT)
Dept: OUTPATIENT SERVICES | Facility: HOSPITAL | Age: 74
LOS: 1 days | Discharge: HOME | End: 2019-02-19

## 2019-02-19 VITALS
SYSTOLIC BLOOD PRESSURE: 150 MMHG | HEIGHT: 66 IN | WEIGHT: 199.96 LBS | TEMPERATURE: 98 F | HEART RATE: 87 BPM | DIASTOLIC BLOOD PRESSURE: 86 MMHG | RESPIRATION RATE: 18 BRPM

## 2019-02-19 DIAGNOSIS — K31.819 ANGIODYSPLASIA OF STOMACH AND DUODENUM WITHOUT BLEEDING: ICD-10-CM

## 2019-02-19 DIAGNOSIS — Z90.49 ACQUIRED ABSENCE OF OTHER SPECIFIED PARTS OF DIGESTIVE TRACT: Chronic | ICD-10-CM

## 2019-02-19 DIAGNOSIS — Z98.890 OTHER SPECIFIED POSTPROCEDURAL STATES: Chronic | ICD-10-CM

## 2019-02-19 DIAGNOSIS — K92.2 GASTROINTESTINAL HEMORRHAGE, UNSPECIFIED: ICD-10-CM

## 2019-02-19 DIAGNOSIS — Z79.01 LONG TERM (CURRENT) USE OF ANTICOAGULANTS: ICD-10-CM

## 2019-02-19 NOTE — ASU DISCHARGE PLAN (ADULT/PEDIATRIC). - NOTIFY
Unable to Urinate/Excessive Diarrhea/Inability to Tolerate Liquids or Foods/Fever greater than 101/Swelling that continues/Numbness, tingling/Increased Irritability or Sluggishness/Pain not relieved by Medications/Bleeding that does not stop/Persistent Nausea and Vomiting

## 2019-02-27 ENCOUNTER — OUTPATIENT (OUTPATIENT)
Dept: OUTPATIENT SERVICES | Facility: HOSPITAL | Age: 74
LOS: 1 days | Discharge: HOME | End: 2019-02-27

## 2019-02-27 DIAGNOSIS — I48.91 UNSPECIFIED ATRIAL FIBRILLATION: ICD-10-CM

## 2019-02-27 DIAGNOSIS — Z90.49 ACQUIRED ABSENCE OF OTHER SPECIFIED PARTS OF DIGESTIVE TRACT: Chronic | ICD-10-CM

## 2019-02-27 DIAGNOSIS — Z98.890 OTHER SPECIFIED POSTPROCEDURAL STATES: Chronic | ICD-10-CM

## 2019-02-27 DIAGNOSIS — Z79.01 LONG TERM (CURRENT) USE OF ANTICOAGULANTS: ICD-10-CM

## 2019-03-15 ENCOUNTER — OUTPATIENT (OUTPATIENT)
Dept: OUTPATIENT SERVICES | Facility: HOSPITAL | Age: 74
LOS: 1 days | Discharge: HOME | End: 2019-03-15

## 2019-03-15 VITALS
HEIGHT: 66 IN | TEMPERATURE: 97 F | OXYGEN SATURATION: 97 % | HEART RATE: 82 BPM | RESPIRATION RATE: 17 BRPM | WEIGHT: 201.06 LBS | DIASTOLIC BLOOD PRESSURE: 70 MMHG | SYSTOLIC BLOOD PRESSURE: 134 MMHG

## 2019-03-15 VITALS
DIASTOLIC BLOOD PRESSURE: 71 MMHG | SYSTOLIC BLOOD PRESSURE: 141 MMHG | OXYGEN SATURATION: 98 % | RESPIRATION RATE: 17 BRPM | HEART RATE: 78 BPM

## 2019-03-15 DIAGNOSIS — Z98.890 OTHER SPECIFIED POSTPROCEDURAL STATES: Chronic | ICD-10-CM

## 2019-03-15 DIAGNOSIS — Z90.49 ACQUIRED ABSENCE OF OTHER SPECIFIED PARTS OF DIGESTIVE TRACT: Chronic | ICD-10-CM

## 2019-03-15 NOTE — ASU PREOP CHECKLIST - TO WHOM
opportunity for questions and answers rendered B Yousif GOMEZ opportunity for questions and answers rendered

## 2019-03-19 DIAGNOSIS — H25.11 AGE-RELATED NUCLEAR CATARACT, RIGHT EYE: ICD-10-CM

## 2019-03-19 DIAGNOSIS — Z79.82 LONG TERM (CURRENT) USE OF ASPIRIN: ICD-10-CM

## 2019-03-27 ENCOUNTER — OUTPATIENT (OUTPATIENT)
Dept: OUTPATIENT SERVICES | Facility: HOSPITAL | Age: 74
LOS: 1 days | Discharge: HOME | End: 2019-03-27

## 2019-03-27 DIAGNOSIS — Z79.01 LONG TERM (CURRENT) USE OF ANTICOAGULANTS: ICD-10-CM

## 2019-03-27 DIAGNOSIS — Z90.49 ACQUIRED ABSENCE OF OTHER SPECIFIED PARTS OF DIGESTIVE TRACT: Chronic | ICD-10-CM

## 2019-03-27 DIAGNOSIS — I48.91 UNSPECIFIED ATRIAL FIBRILLATION: ICD-10-CM

## 2019-03-27 DIAGNOSIS — Z98.890 OTHER SPECIFIED POSTPROCEDURAL STATES: Chronic | ICD-10-CM

## 2019-03-29 ENCOUNTER — OUTPATIENT (OUTPATIENT)
Dept: OUTPATIENT SERVICES | Facility: HOSPITAL | Age: 74
LOS: 1 days | Discharge: HOME | End: 2019-03-29

## 2019-03-29 VITALS — SYSTOLIC BLOOD PRESSURE: 143 MMHG | DIASTOLIC BLOOD PRESSURE: 70 MMHG | HEART RATE: 82 BPM

## 2019-03-29 VITALS
HEART RATE: 79 BPM | DIASTOLIC BLOOD PRESSURE: 74 MMHG | OXYGEN SATURATION: 97 % | RESPIRATION RATE: 16 BRPM | TEMPERATURE: 99 F | HEIGHT: 66 IN | WEIGHT: 201.06 LBS | SYSTOLIC BLOOD PRESSURE: 140 MMHG

## 2019-03-29 DIAGNOSIS — Z98.890 OTHER SPECIFIED POSTPROCEDURAL STATES: Chronic | ICD-10-CM

## 2019-03-29 DIAGNOSIS — Z90.49 ACQUIRED ABSENCE OF OTHER SPECIFIED PARTS OF DIGESTIVE TRACT: Chronic | ICD-10-CM

## 2019-03-29 RX ORDER — WARFARIN SODIUM 2.5 MG/1
1 TABLET ORAL
Qty: 0 | Refills: 0 | COMMUNITY

## 2019-03-29 RX ORDER — FUROSEMIDE 40 MG
1 TABLET ORAL
Qty: 0 | Refills: 0 | COMMUNITY

## 2019-03-29 RX ORDER — RIVAROXABAN 15 MG-20MG
1 KIT ORAL
Qty: 0 | Refills: 0 | COMMUNITY

## 2019-03-29 RX ORDER — ONDANSETRON 8 MG/1
4 TABLET, FILM COATED ORAL ONCE
Qty: 0 | Refills: 0 | Status: DISCONTINUED | OUTPATIENT
Start: 2019-03-29 | End: 2019-04-13

## 2019-03-29 RX ORDER — ROSUVASTATIN CALCIUM 5 MG/1
1 TABLET ORAL
Qty: 0 | Refills: 0 | COMMUNITY

## 2019-03-29 RX ORDER — ASPIRIN/CALCIUM CARB/MAGNESIUM 324 MG
1 TABLET ORAL
Qty: 0 | Refills: 0 | COMMUNITY

## 2019-04-04 DIAGNOSIS — H25.89 OTHER AGE-RELATED CATARACT: ICD-10-CM

## 2019-04-04 DIAGNOSIS — I25.10 ATHEROSCLEROTIC HEART DISEASE OF NATIVE CORONARY ARTERY WITHOUT ANGINA PECTORIS: ICD-10-CM

## 2019-04-04 DIAGNOSIS — E78.00 PURE HYPERCHOLESTEROLEMIA, UNSPECIFIED: ICD-10-CM

## 2019-04-24 ENCOUNTER — OUTPATIENT (OUTPATIENT)
Dept: OUTPATIENT SERVICES | Facility: HOSPITAL | Age: 74
LOS: 1 days | Discharge: HOME | End: 2019-04-24

## 2019-04-24 DIAGNOSIS — Z90.49 ACQUIRED ABSENCE OF OTHER SPECIFIED PARTS OF DIGESTIVE TRACT: Chronic | ICD-10-CM

## 2019-04-24 DIAGNOSIS — Z79.01 LONG TERM (CURRENT) USE OF ANTICOAGULANTS: ICD-10-CM

## 2019-04-24 DIAGNOSIS — I48.91 UNSPECIFIED ATRIAL FIBRILLATION: ICD-10-CM

## 2019-04-24 DIAGNOSIS — Z98.890 OTHER SPECIFIED POSTPROCEDURAL STATES: Chronic | ICD-10-CM

## 2019-05-01 ENCOUNTER — OUTPATIENT (OUTPATIENT)
Dept: OUTPATIENT SERVICES | Facility: HOSPITAL | Age: 74
LOS: 1 days | Discharge: HOME | End: 2019-05-01

## 2019-05-01 DIAGNOSIS — Z79.01 LONG TERM (CURRENT) USE OF ANTICOAGULANTS: ICD-10-CM

## 2019-05-01 DIAGNOSIS — I48.91 UNSPECIFIED ATRIAL FIBRILLATION: ICD-10-CM

## 2019-05-01 DIAGNOSIS — Z98.890 OTHER SPECIFIED POSTPROCEDURAL STATES: Chronic | ICD-10-CM

## 2019-05-01 DIAGNOSIS — Z90.49 ACQUIRED ABSENCE OF OTHER SPECIFIED PARTS OF DIGESTIVE TRACT: Chronic | ICD-10-CM

## 2019-05-15 ENCOUNTER — OUTPATIENT (OUTPATIENT)
Dept: OUTPATIENT SERVICES | Facility: HOSPITAL | Age: 74
LOS: 1 days | Discharge: HOME | End: 2019-05-15

## 2019-05-15 DIAGNOSIS — Z90.49 ACQUIRED ABSENCE OF OTHER SPECIFIED PARTS OF DIGESTIVE TRACT: Chronic | ICD-10-CM

## 2019-05-15 DIAGNOSIS — Z79.01 LONG TERM (CURRENT) USE OF ANTICOAGULANTS: ICD-10-CM

## 2019-05-15 DIAGNOSIS — I48.91 UNSPECIFIED ATRIAL FIBRILLATION: ICD-10-CM

## 2019-05-15 DIAGNOSIS — Z98.890 OTHER SPECIFIED POSTPROCEDURAL STATES: Chronic | ICD-10-CM

## 2019-06-05 ENCOUNTER — OUTPATIENT (OUTPATIENT)
Dept: OUTPATIENT SERVICES | Facility: HOSPITAL | Age: 74
LOS: 1 days | Discharge: HOME | End: 2019-06-05

## 2019-06-05 DIAGNOSIS — I48.91 UNSPECIFIED ATRIAL FIBRILLATION: ICD-10-CM

## 2019-06-05 DIAGNOSIS — Z90.49 ACQUIRED ABSENCE OF OTHER SPECIFIED PARTS OF DIGESTIVE TRACT: Chronic | ICD-10-CM

## 2019-06-05 DIAGNOSIS — Z98.890 OTHER SPECIFIED POSTPROCEDURAL STATES: Chronic | ICD-10-CM

## 2019-06-05 DIAGNOSIS — Z79.01 LONG TERM (CURRENT) USE OF ANTICOAGULANTS: ICD-10-CM

## 2019-06-05 LAB
POCT INR: 3.7 RATIO — HIGH (ref 0.9–1.2)
POCT PT: 44.3 SEC — HIGH (ref 10–13.4)

## 2019-06-19 ENCOUNTER — OUTPATIENT (OUTPATIENT)
Dept: OUTPATIENT SERVICES | Facility: HOSPITAL | Age: 74
LOS: 1 days | Discharge: HOME | End: 2019-06-19

## 2019-06-19 DIAGNOSIS — Z79.01 LONG TERM (CURRENT) USE OF ANTICOAGULANTS: ICD-10-CM

## 2019-06-19 DIAGNOSIS — N40.1 BENIGN PROSTATIC HYPERPLASIA WITH LOWER URINARY TRACT SYMPTOMS: ICD-10-CM

## 2019-06-19 DIAGNOSIS — N39.0 URINARY TRACT INFECTION, SITE NOT SPECIFIED: ICD-10-CM

## 2019-06-19 DIAGNOSIS — Z98.890 OTHER SPECIFIED POSTPROCEDURAL STATES: Chronic | ICD-10-CM

## 2019-06-19 DIAGNOSIS — E78.00 PURE HYPERCHOLESTEROLEMIA, UNSPECIFIED: ICD-10-CM

## 2019-06-19 DIAGNOSIS — Z90.49 ACQUIRED ABSENCE OF OTHER SPECIFIED PARTS OF DIGESTIVE TRACT: Chronic | ICD-10-CM

## 2019-06-19 DIAGNOSIS — Z79.899 OTHER LONG TERM (CURRENT) DRUG THERAPY: ICD-10-CM

## 2019-06-19 DIAGNOSIS — D50.9 IRON DEFICIENCY ANEMIA, UNSPECIFIED: ICD-10-CM

## 2019-06-19 DIAGNOSIS — R97.20 ELEVATED PROSTATE SPECIFIC ANTIGEN [PSA]: ICD-10-CM

## 2019-06-19 DIAGNOSIS — I48.91 UNSPECIFIED ATRIAL FIBRILLATION: ICD-10-CM

## 2019-07-03 ENCOUNTER — OUTPATIENT (OUTPATIENT)
Dept: OUTPATIENT SERVICES | Facility: HOSPITAL | Age: 74
LOS: 1 days | Discharge: HOME | End: 2019-07-03

## 2019-07-03 DIAGNOSIS — I48.91 UNSPECIFIED ATRIAL FIBRILLATION: ICD-10-CM

## 2019-07-03 DIAGNOSIS — Z90.49 ACQUIRED ABSENCE OF OTHER SPECIFIED PARTS OF DIGESTIVE TRACT: Chronic | ICD-10-CM

## 2019-07-03 DIAGNOSIS — Z98.890 OTHER SPECIFIED POSTPROCEDURAL STATES: Chronic | ICD-10-CM

## 2019-07-03 DIAGNOSIS — Z79.01 LONG TERM (CURRENT) USE OF ANTICOAGULANTS: ICD-10-CM

## 2019-07-10 ENCOUNTER — OUTPATIENT (OUTPATIENT)
Dept: OUTPATIENT SERVICES | Facility: HOSPITAL | Age: 74
LOS: 1 days | Discharge: HOME | End: 2019-07-10

## 2019-07-10 DIAGNOSIS — Z90.49 ACQUIRED ABSENCE OF OTHER SPECIFIED PARTS OF DIGESTIVE TRACT: Chronic | ICD-10-CM

## 2019-07-10 DIAGNOSIS — I48.91 UNSPECIFIED ATRIAL FIBRILLATION: ICD-10-CM

## 2019-07-10 DIAGNOSIS — Z79.01 LONG TERM (CURRENT) USE OF ANTICOAGULANTS: ICD-10-CM

## 2019-07-10 DIAGNOSIS — Z98.890 OTHER SPECIFIED POSTPROCEDURAL STATES: Chronic | ICD-10-CM

## 2019-07-10 LAB
POCT INR: 1.9 RATIO — HIGH (ref 0.9–1.2)
POCT PT: 22.3 SEC — HIGH (ref 10–13.4)

## 2019-07-17 ENCOUNTER — OUTPATIENT (OUTPATIENT)
Dept: OUTPATIENT SERVICES | Facility: HOSPITAL | Age: 74
LOS: 1 days | Discharge: HOME | End: 2019-07-17

## 2019-07-17 DIAGNOSIS — Z79.01 LONG TERM (CURRENT) USE OF ANTICOAGULANTS: ICD-10-CM

## 2019-07-17 DIAGNOSIS — Z90.49 ACQUIRED ABSENCE OF OTHER SPECIFIED PARTS OF DIGESTIVE TRACT: Chronic | ICD-10-CM

## 2019-07-17 DIAGNOSIS — Z98.890 OTHER SPECIFIED POSTPROCEDURAL STATES: Chronic | ICD-10-CM

## 2019-07-17 DIAGNOSIS — I48.91 UNSPECIFIED ATRIAL FIBRILLATION: ICD-10-CM

## 2019-07-24 ENCOUNTER — OUTPATIENT (OUTPATIENT)
Dept: OUTPATIENT SERVICES | Facility: HOSPITAL | Age: 74
LOS: 1 days | Discharge: HOME | End: 2019-07-24

## 2019-07-24 DIAGNOSIS — Z79.01 LONG TERM (CURRENT) USE OF ANTICOAGULANTS: ICD-10-CM

## 2019-07-24 DIAGNOSIS — I48.91 UNSPECIFIED ATRIAL FIBRILLATION: ICD-10-CM

## 2019-07-24 DIAGNOSIS — Z98.890 OTHER SPECIFIED POSTPROCEDURAL STATES: Chronic | ICD-10-CM

## 2019-07-24 DIAGNOSIS — Z90.49 ACQUIRED ABSENCE OF OTHER SPECIFIED PARTS OF DIGESTIVE TRACT: Chronic | ICD-10-CM

## 2019-08-07 ENCOUNTER — OUTPATIENT (OUTPATIENT)
Dept: OUTPATIENT SERVICES | Facility: HOSPITAL | Age: 74
LOS: 1 days | Discharge: HOME | End: 2019-08-07

## 2019-08-07 DIAGNOSIS — Z90.49 ACQUIRED ABSENCE OF OTHER SPECIFIED PARTS OF DIGESTIVE TRACT: Chronic | ICD-10-CM

## 2019-08-07 DIAGNOSIS — I48.91 UNSPECIFIED ATRIAL FIBRILLATION: ICD-10-CM

## 2019-08-07 DIAGNOSIS — Z98.890 OTHER SPECIFIED POSTPROCEDURAL STATES: Chronic | ICD-10-CM

## 2019-08-07 DIAGNOSIS — Z79.01 LONG TERM (CURRENT) USE OF ANTICOAGULANTS: ICD-10-CM

## 2019-08-12 ENCOUNTER — APPOINTMENT (OUTPATIENT)
Dept: CARDIOLOGY | Facility: CLINIC | Age: 74
End: 2019-08-12
Payer: MEDICARE

## 2019-08-12 PROCEDURE — 99214 OFFICE O/P EST MOD 30 MIN: CPT

## 2019-08-12 PROCEDURE — 93000 ELECTROCARDIOGRAM COMPLETE: CPT

## 2019-08-21 ENCOUNTER — OUTPATIENT (OUTPATIENT)
Dept: OUTPATIENT SERVICES | Facility: HOSPITAL | Age: 74
LOS: 1 days | Discharge: HOME | End: 2019-08-21

## 2019-08-21 DIAGNOSIS — Z90.49 ACQUIRED ABSENCE OF OTHER SPECIFIED PARTS OF DIGESTIVE TRACT: Chronic | ICD-10-CM

## 2019-08-21 DIAGNOSIS — Z98.890 OTHER SPECIFIED POSTPROCEDURAL STATES: Chronic | ICD-10-CM

## 2019-08-21 DIAGNOSIS — I48.91 UNSPECIFIED ATRIAL FIBRILLATION: ICD-10-CM

## 2019-08-21 DIAGNOSIS — Z79.01 LONG TERM (CURRENT) USE OF ANTICOAGULANTS: ICD-10-CM

## 2019-08-21 LAB
POCT INR: 2.3 RATIO — HIGH (ref 0.9–1.2)
POCT PT: 27.9 SEC — HIGH (ref 10–13.4)

## 2019-09-03 ENCOUNTER — OUTPATIENT (OUTPATIENT)
Dept: OUTPATIENT SERVICES | Facility: HOSPITAL | Age: 74
LOS: 1 days | Discharge: HOME | End: 2019-09-03

## 2019-09-03 DIAGNOSIS — I71.4 ABDOMINAL AORTIC ANEURYSM, WITHOUT RUPTURE: ICD-10-CM

## 2019-09-03 DIAGNOSIS — Z90.49 ACQUIRED ABSENCE OF OTHER SPECIFIED PARTS OF DIGESTIVE TRACT: Chronic | ICD-10-CM

## 2019-09-03 DIAGNOSIS — Z98.890 OTHER SPECIFIED POSTPROCEDURAL STATES: Chronic | ICD-10-CM

## 2019-09-03 DIAGNOSIS — R79.89 OTHER SPECIFIED ABNORMAL FINDINGS OF BLOOD CHEMISTRY: ICD-10-CM

## 2019-09-18 ENCOUNTER — OUTPATIENT (OUTPATIENT)
Dept: OUTPATIENT SERVICES | Facility: HOSPITAL | Age: 74
LOS: 1 days | Discharge: HOME | End: 2019-09-18

## 2019-09-18 DIAGNOSIS — I48.91 UNSPECIFIED ATRIAL FIBRILLATION: ICD-10-CM

## 2019-09-18 DIAGNOSIS — Z98.890 OTHER SPECIFIED POSTPROCEDURAL STATES: Chronic | ICD-10-CM

## 2019-09-18 DIAGNOSIS — Z90.49 ACQUIRED ABSENCE OF OTHER SPECIFIED PARTS OF DIGESTIVE TRACT: Chronic | ICD-10-CM

## 2019-09-18 DIAGNOSIS — Z79.01 LONG TERM (CURRENT) USE OF ANTICOAGULANTS: ICD-10-CM

## 2019-10-16 ENCOUNTER — OUTPATIENT (OUTPATIENT)
Dept: OUTPATIENT SERVICES | Facility: HOSPITAL | Age: 74
LOS: 1 days | Discharge: HOME | End: 2019-10-16

## 2019-10-16 DIAGNOSIS — Z90.49 ACQUIRED ABSENCE OF OTHER SPECIFIED PARTS OF DIGESTIVE TRACT: Chronic | ICD-10-CM

## 2019-10-16 DIAGNOSIS — I48.91 UNSPECIFIED ATRIAL FIBRILLATION: ICD-10-CM

## 2019-10-16 DIAGNOSIS — Z79.01 LONG TERM (CURRENT) USE OF ANTICOAGULANTS: ICD-10-CM

## 2019-10-16 DIAGNOSIS — Z98.890 OTHER SPECIFIED POSTPROCEDURAL STATES: Chronic | ICD-10-CM

## 2019-10-16 LAB
POCT INR: 3.7 RATIO — HIGH (ref 0.9–1.2)
POCT PT: 43.9 SEC — HIGH (ref 10–13.4)

## 2019-10-30 ENCOUNTER — OUTPATIENT (OUTPATIENT)
Dept: OUTPATIENT SERVICES | Facility: HOSPITAL | Age: 74
LOS: 1 days | Discharge: HOME | End: 2019-10-30

## 2019-10-30 DIAGNOSIS — Z79.01 LONG TERM (CURRENT) USE OF ANTICOAGULANTS: ICD-10-CM

## 2019-10-30 DIAGNOSIS — Z90.49 ACQUIRED ABSENCE OF OTHER SPECIFIED PARTS OF DIGESTIVE TRACT: Chronic | ICD-10-CM

## 2019-10-30 DIAGNOSIS — Z98.890 OTHER SPECIFIED POSTPROCEDURAL STATES: Chronic | ICD-10-CM

## 2019-10-30 DIAGNOSIS — I48.91 UNSPECIFIED ATRIAL FIBRILLATION: ICD-10-CM

## 2019-10-30 LAB
POCT INR: 2.8 RATIO — HIGH (ref 0.9–1.2)
POCT PT: 33.6 SEC — HIGH (ref 10–13.4)

## 2019-11-20 ENCOUNTER — OUTPATIENT (OUTPATIENT)
Dept: OUTPATIENT SERVICES | Facility: HOSPITAL | Age: 74
LOS: 1 days | Discharge: HOME | End: 2019-11-20

## 2019-11-20 DIAGNOSIS — Z90.49 ACQUIRED ABSENCE OF OTHER SPECIFIED PARTS OF DIGESTIVE TRACT: Chronic | ICD-10-CM

## 2019-11-20 DIAGNOSIS — Z98.890 OTHER SPECIFIED POSTPROCEDURAL STATES: Chronic | ICD-10-CM

## 2019-11-20 DIAGNOSIS — Z79.01 LONG TERM (CURRENT) USE OF ANTICOAGULANTS: ICD-10-CM

## 2019-11-20 DIAGNOSIS — I48.91 UNSPECIFIED ATRIAL FIBRILLATION: ICD-10-CM

## 2019-12-11 ENCOUNTER — OUTPATIENT (OUTPATIENT)
Dept: OUTPATIENT SERVICES | Facility: HOSPITAL | Age: 74
LOS: 1 days | Discharge: HOME | End: 2019-12-11

## 2019-12-11 DIAGNOSIS — Z90.49 ACQUIRED ABSENCE OF OTHER SPECIFIED PARTS OF DIGESTIVE TRACT: Chronic | ICD-10-CM

## 2019-12-11 DIAGNOSIS — N18.3 CHRONIC KIDNEY DISEASE, STAGE 3 (MODERATE): ICD-10-CM

## 2019-12-11 DIAGNOSIS — E78.00 PURE HYPERCHOLESTEROLEMIA, UNSPECIFIED: ICD-10-CM

## 2019-12-11 DIAGNOSIS — D50.9 IRON DEFICIENCY ANEMIA, UNSPECIFIED: ICD-10-CM

## 2019-12-11 DIAGNOSIS — N39.0 URINARY TRACT INFECTION, SITE NOT SPECIFIED: ICD-10-CM

## 2019-12-11 DIAGNOSIS — E11.9 TYPE 2 DIABETES MELLITUS WITHOUT COMPLICATIONS: ICD-10-CM

## 2019-12-11 DIAGNOSIS — Z79.899 OTHER LONG TERM (CURRENT) DRUG THERAPY: ICD-10-CM

## 2019-12-11 DIAGNOSIS — Z98.890 OTHER SPECIFIED POSTPROCEDURAL STATES: Chronic | ICD-10-CM

## 2019-12-18 ENCOUNTER — OUTPATIENT (OUTPATIENT)
Dept: OUTPATIENT SERVICES | Facility: HOSPITAL | Age: 74
LOS: 1 days | Discharge: HOME | End: 2019-12-18

## 2019-12-18 DIAGNOSIS — Z98.890 OTHER SPECIFIED POSTPROCEDURAL STATES: Chronic | ICD-10-CM

## 2019-12-18 DIAGNOSIS — Z90.49 ACQUIRED ABSENCE OF OTHER SPECIFIED PARTS OF DIGESTIVE TRACT: Chronic | ICD-10-CM

## 2019-12-18 DIAGNOSIS — Z79.01 LONG TERM (CURRENT) USE OF ANTICOAGULANTS: ICD-10-CM

## 2019-12-18 DIAGNOSIS — I48.91 UNSPECIFIED ATRIAL FIBRILLATION: ICD-10-CM

## 2019-12-31 ENCOUNTER — OUTPATIENT (OUTPATIENT)
Dept: OUTPATIENT SERVICES | Facility: HOSPITAL | Age: 74
LOS: 1 days | Discharge: HOME | End: 2019-12-31

## 2019-12-31 DIAGNOSIS — Z90.49 ACQUIRED ABSENCE OF OTHER SPECIFIED PARTS OF DIGESTIVE TRACT: Chronic | ICD-10-CM

## 2019-12-31 DIAGNOSIS — Z98.890 OTHER SPECIFIED POSTPROCEDURAL STATES: Chronic | ICD-10-CM

## 2019-12-31 DIAGNOSIS — Z79.01 LONG TERM (CURRENT) USE OF ANTICOAGULANTS: ICD-10-CM

## 2019-12-31 DIAGNOSIS — I48.91 UNSPECIFIED ATRIAL FIBRILLATION: ICD-10-CM

## 2019-12-31 LAB
INR PPP: 2.5 RATIO
POCT-PROTHROMBIN TIME: 29.8 SECS

## 2020-01-29 ENCOUNTER — OUTPATIENT (OUTPATIENT)
Dept: OUTPATIENT SERVICES | Facility: HOSPITAL | Age: 75
LOS: 1 days | Discharge: HOME | End: 2020-01-29

## 2020-01-29 DIAGNOSIS — Z79.01 LONG TERM (CURRENT) USE OF ANTICOAGULANTS: ICD-10-CM

## 2020-01-29 DIAGNOSIS — Z98.890 OTHER SPECIFIED POSTPROCEDURAL STATES: Chronic | ICD-10-CM

## 2020-01-29 DIAGNOSIS — Z90.49 ACQUIRED ABSENCE OF OTHER SPECIFIED PARTS OF DIGESTIVE TRACT: Chronic | ICD-10-CM

## 2020-01-29 DIAGNOSIS — I48.91 UNSPECIFIED ATRIAL FIBRILLATION: ICD-10-CM

## 2020-01-29 LAB
POCT INR: 2.4 RATIO — HIGH (ref 0.9–1.2)
POCT PT: 28.8 SEC — HIGH (ref 10–13.4)

## 2020-01-31 LAB
INR PPP: 2.4 RATIO
POCT-PROTHROMBIN TIME: 28.8 SECS
QUALITY CONTROL: YES

## 2020-02-03 ENCOUNTER — APPOINTMENT (OUTPATIENT)
Dept: CARDIOLOGY | Facility: CLINIC | Age: 75
End: 2020-02-03
Payer: MEDICARE

## 2020-02-03 PROCEDURE — 93000 ELECTROCARDIOGRAM COMPLETE: CPT

## 2020-02-03 PROCEDURE — 99214 OFFICE O/P EST MOD 30 MIN: CPT

## 2020-02-26 ENCOUNTER — OUTPATIENT (OUTPATIENT)
Dept: OUTPATIENT SERVICES | Facility: HOSPITAL | Age: 75
LOS: 1 days | Discharge: HOME | End: 2020-02-26

## 2020-02-26 DIAGNOSIS — Z90.49 ACQUIRED ABSENCE OF OTHER SPECIFIED PARTS OF DIGESTIVE TRACT: Chronic | ICD-10-CM

## 2020-02-26 DIAGNOSIS — I48.91 UNSPECIFIED ATRIAL FIBRILLATION: ICD-10-CM

## 2020-02-26 DIAGNOSIS — Z98.890 OTHER SPECIFIED POSTPROCEDURAL STATES: Chronic | ICD-10-CM

## 2020-02-26 DIAGNOSIS — Z79.01 LONG TERM (CURRENT) USE OF ANTICOAGULANTS: ICD-10-CM

## 2020-02-26 LAB
POCT INR: 2.6 RATIO — HIGH (ref 0.9–1.2)
POCT PT: 31 SEC — HIGH (ref 10–13.4)

## 2020-03-03 LAB
INR PPP: 2.6 RATIO
POCT-PROTHROMBIN TIME: 31 SECS
QUALITY CONTROL: YES

## 2020-03-09 ENCOUNTER — APPOINTMENT (OUTPATIENT)
Dept: CARDIOLOGY | Facility: CLINIC | Age: 75
End: 2020-03-09
Payer: MEDICARE

## 2020-03-09 PROCEDURE — 93306 TTE W/DOPPLER COMPLETE: CPT

## 2020-03-12 ENCOUNTER — RECORD ABSTRACTING (OUTPATIENT)
Age: 75
End: 2020-03-12

## 2020-03-12 DIAGNOSIS — Z86.79 PERSONAL HISTORY OF OTHER DISEASES OF THE CIRCULATORY SYSTEM: ICD-10-CM

## 2020-03-12 DIAGNOSIS — I25.2 OLD MYOCARDIAL INFARCTION: ICD-10-CM

## 2020-03-12 RX ORDER — METOPROLOL SUCCINATE 100 MG
100 TABLET, EXTENDED RELEASE 24 HR ORAL DAILY
Refills: 0 | Status: ACTIVE | COMMUNITY

## 2020-03-12 RX ORDER — PANTOPRAZOLE SODIUM 40 MG/1
40 TABLET, DELAYED RELEASE ORAL DAILY
Refills: 0 | Status: ACTIVE | COMMUNITY

## 2020-03-12 RX ORDER — ROSUVASTATIN CALCIUM 40 MG/1
40 TABLET, FILM COATED ORAL
Refills: 0 | Status: ACTIVE | COMMUNITY

## 2020-03-12 RX ORDER — FUROSEMIDE 20 MG/1
20 TABLET ORAL DAILY
Refills: 0 | Status: ACTIVE | COMMUNITY

## 2020-03-16 ENCOUNTER — APPOINTMENT (OUTPATIENT)
Dept: CARDIOLOGY | Facility: CLINIC | Age: 75
End: 2020-03-16
Payer: MEDICARE

## 2020-03-16 PROCEDURE — 93000 ELECTROCARDIOGRAM COMPLETE: CPT

## 2020-03-16 PROCEDURE — 99214 OFFICE O/P EST MOD 30 MIN: CPT

## 2020-04-01 ENCOUNTER — OUTPATIENT (OUTPATIENT)
Dept: OUTPATIENT SERVICES | Facility: HOSPITAL | Age: 75
LOS: 1 days | Discharge: HOME | End: 2020-04-01

## 2020-04-01 DIAGNOSIS — I48.91 UNSPECIFIED ATRIAL FIBRILLATION: ICD-10-CM

## 2020-04-01 DIAGNOSIS — Z79.01 LONG TERM (CURRENT) USE OF ANTICOAGULANTS: ICD-10-CM

## 2020-04-01 DIAGNOSIS — Z98.890 OTHER SPECIFIED POSTPROCEDURAL STATES: Chronic | ICD-10-CM

## 2020-04-01 DIAGNOSIS — Z90.49 ACQUIRED ABSENCE OF OTHER SPECIFIED PARTS OF DIGESTIVE TRACT: Chronic | ICD-10-CM

## 2020-04-01 LAB
INR PPP: 2.7 RATIO
POCT-PROTHROMBIN TIME: 34.4 SECS
QUALITY CONTROL: YES

## 2020-05-06 ENCOUNTER — OUTPATIENT (OUTPATIENT)
Dept: OUTPATIENT SERVICES | Facility: HOSPITAL | Age: 75
LOS: 1 days | Discharge: HOME | End: 2020-05-06

## 2020-05-06 DIAGNOSIS — Z90.49 ACQUIRED ABSENCE OF OTHER SPECIFIED PARTS OF DIGESTIVE TRACT: Chronic | ICD-10-CM

## 2020-05-06 DIAGNOSIS — Z98.890 OTHER SPECIFIED POSTPROCEDURAL STATES: Chronic | ICD-10-CM

## 2020-05-06 DIAGNOSIS — I48.91 UNSPECIFIED ATRIAL FIBRILLATION: ICD-10-CM

## 2020-05-06 DIAGNOSIS — Z79.01 LONG TERM (CURRENT) USE OF ANTICOAGULANTS: ICD-10-CM

## 2020-05-06 LAB
INR PPP: 2.5 RATIO
POCT-PROTHROMBIN TIME: 30.4 SECS

## 2020-06-11 ENCOUNTER — OUTPATIENT (OUTPATIENT)
Dept: OUTPATIENT SERVICES | Facility: HOSPITAL | Age: 75
LOS: 1 days | Discharge: HOME | End: 2020-06-11

## 2020-06-11 DIAGNOSIS — Z79.01 LONG TERM (CURRENT) USE OF ANTICOAGULANTS: ICD-10-CM

## 2020-06-11 DIAGNOSIS — I48.91 UNSPECIFIED ATRIAL FIBRILLATION: ICD-10-CM

## 2020-06-11 DIAGNOSIS — Z90.49 ACQUIRED ABSENCE OF OTHER SPECIFIED PARTS OF DIGESTIVE TRACT: Chronic | ICD-10-CM

## 2020-06-11 DIAGNOSIS — Z98.890 OTHER SPECIFIED POSTPROCEDURAL STATES: Chronic | ICD-10-CM

## 2020-06-11 LAB
INR PPP: 2.8 RATIO
POCT INR: 2.8 RATIO — HIGH (ref 0.9–1.2)
POCT PT: 34 SEC — HIGH (ref 10–13.4)
POCT-PROTHROMBIN TIME: 34 SECS

## 2020-07-09 ENCOUNTER — OUTPATIENT (OUTPATIENT)
Dept: OUTPATIENT SERVICES | Facility: HOSPITAL | Age: 75
LOS: 1 days | Discharge: HOME | End: 2020-07-09

## 2020-07-09 DIAGNOSIS — I48.91 UNSPECIFIED ATRIAL FIBRILLATION: ICD-10-CM

## 2020-07-09 DIAGNOSIS — Z79.01 LONG TERM (CURRENT) USE OF ANTICOAGULANTS: ICD-10-CM

## 2020-07-09 DIAGNOSIS — Z90.49 ACQUIRED ABSENCE OF OTHER SPECIFIED PARTS OF DIGESTIVE TRACT: Chronic | ICD-10-CM

## 2020-07-09 DIAGNOSIS — Z98.890 OTHER SPECIFIED POSTPROCEDURAL STATES: Chronic | ICD-10-CM

## 2020-07-09 LAB
POCT INR: 2.4 RATIO — HIGH (ref 0.9–1.2)
POCT PT: 28.7 SEC — HIGH (ref 10–13.4)

## 2020-08-06 ENCOUNTER — OUTPATIENT (OUTPATIENT)
Dept: OUTPATIENT SERVICES | Facility: HOSPITAL | Age: 75
LOS: 1 days | Discharge: HOME | End: 2020-08-06

## 2020-08-06 DIAGNOSIS — Z98.890 OTHER SPECIFIED POSTPROCEDURAL STATES: Chronic | ICD-10-CM

## 2020-08-06 DIAGNOSIS — Z90.49 ACQUIRED ABSENCE OF OTHER SPECIFIED PARTS OF DIGESTIVE TRACT: Chronic | ICD-10-CM

## 2020-08-06 DIAGNOSIS — I48.91 UNSPECIFIED ATRIAL FIBRILLATION: ICD-10-CM

## 2020-08-06 DIAGNOSIS — Z79.01 LONG TERM (CURRENT) USE OF ANTICOAGULANTS: ICD-10-CM

## 2020-08-06 LAB
POCT INR: 2.6 RATIO — HIGH (ref 0.9–1.2)
POCT PT: 31.1 SEC — HIGH (ref 10–13.4)

## 2020-09-03 ENCOUNTER — APPOINTMENT (OUTPATIENT)
Dept: MEDICATION MANAGEMENT | Facility: CLINIC | Age: 75
End: 2020-09-03

## 2020-09-03 ENCOUNTER — OUTPATIENT (OUTPATIENT)
Dept: OUTPATIENT SERVICES | Facility: HOSPITAL | Age: 75
LOS: 1 days | Discharge: HOME | End: 2020-09-03

## 2020-09-03 VITALS — TEMPERATURE: 96.5 F | BODY MASS INDEX: 32.3 KG/M2 | WEIGHT: 201 LBS | HEIGHT: 66 IN

## 2020-09-03 DIAGNOSIS — Z90.49 ACQUIRED ABSENCE OF OTHER SPECIFIED PARTS OF DIGESTIVE TRACT: Chronic | ICD-10-CM

## 2020-09-03 DIAGNOSIS — I48.91 UNSPECIFIED ATRIAL FIBRILLATION: ICD-10-CM

## 2020-09-03 DIAGNOSIS — Z98.890 OTHER SPECIFIED POSTPROCEDURAL STATES: Chronic | ICD-10-CM

## 2020-09-03 DIAGNOSIS — Z79.01 LONG TERM (CURRENT) USE OF ANTICOAGULANTS: ICD-10-CM

## 2020-09-03 LAB
INR PPP: 2.3 RATIO
POCT-PROTHROMBIN TIME: 27 SECS
QUALITY CONTROL: YES

## 2020-09-14 ENCOUNTER — APPOINTMENT (OUTPATIENT)
Dept: CARDIOLOGY | Facility: CLINIC | Age: 75
End: 2020-09-14
Payer: MEDICARE

## 2020-09-14 VITALS
WEIGHT: 206 LBS | HEIGHT: 66 IN | TEMPERATURE: 98.9 F | DIASTOLIC BLOOD PRESSURE: 80 MMHG | BODY MASS INDEX: 33.11 KG/M2 | HEART RATE: 75 BPM | SYSTOLIC BLOOD PRESSURE: 124 MMHG

## 2020-09-14 DIAGNOSIS — Z00.00 ENCOUNTER FOR GENERAL ADULT MEDICAL EXAMINATION W/OUT ABNORMAL FINDINGS: ICD-10-CM

## 2020-09-14 PROCEDURE — 99214 OFFICE O/P EST MOD 30 MIN: CPT

## 2020-09-14 PROCEDURE — 93000 ELECTROCARDIOGRAM COMPLETE: CPT

## 2020-09-14 NOTE — ASSESSMENT
[FreeTextEntry1] : PVD\par Cigarette smoker\par AAA\par Moderate LV systolic dysfunction\par LV thrombus with LV aneurysm\par Hyperlipidemia \par PVD

## 2020-09-14 NOTE — HISTORY OF PRESENT ILLNESS
[FreeTextEntry1] : PVD\par Cigarette smoker\par AAA\par Moderate LV systolic dysfunction\par LV thrombus with LV aneurysm\par Hyperlipidemia

## 2020-09-14 NOTE — PHYSICAL EXAM
[General Appearance - Well Developed] : well developed [Normal Conjunctiva] : the conjunctiva exhibited no abnormalities [General Appearance - Well Nourished] : well nourished [General Appearance - In No Acute Distress] : no acute distress [Normal Oropharynx] : normal oropharynx [Exaggerated Use Of Accessory Muscles For Inspiration] : no accessory muscle use [Chest Palpation] : palpation of the chest revealed no abnormalities [Heart Rate And Rhythm] : heart rate and rhythm were normal [Arterial Pulses Normal] : the arterial pulses were normal [Heart Sounds] : normal S1 and S2 [Edema] : no peripheral edema present [Bowel Sounds] : normal bowel sounds [Abdomen Soft] : soft [Abdomen Tenderness] : non-tender [Abnormal Walk] : normal gait [Nail Clubbing] : no clubbing of the fingernails [Petechial Hemorrhages (___cm)] : no petechial hemorrhages [Cyanosis, Localized] : no localized cyanosis [Skin Turgor] : normal skin turgor [Skin Color & Pigmentation] : normal skin color and pigmentation [] : no rash

## 2020-09-14 NOTE — DISCUSSION/SUMMARY
[FreeTextEntry1] : INR levels are followed in the Coumadin clinic.\par Patient was instructed to target their T. Cholesterol to less than 200 mg/dl and LDL cholesterol to less than 70 mg/dl.\par Exercise and weight loss was advised.\par Maintain cardiac medications. \par Patient was advised to repeat a BMP, CBC , fasting lipid profile and hepatic panel.\par RV in 6 months.\par Patient is scheduled to see his vascular surgeon, Sharon in 3/21.\par

## 2020-10-01 ENCOUNTER — OUTPATIENT (OUTPATIENT)
Dept: OUTPATIENT SERVICES | Facility: HOSPITAL | Age: 75
LOS: 1 days | Discharge: HOME | End: 2020-10-01

## 2020-10-01 ENCOUNTER — APPOINTMENT (OUTPATIENT)
Dept: MEDICATION MANAGEMENT | Facility: CLINIC | Age: 75
End: 2020-10-01

## 2020-10-01 VITALS — OXYGEN SATURATION: 98 % | HEART RATE: 82 BPM | RESPIRATION RATE: 16 BRPM

## 2020-10-01 DIAGNOSIS — Z98.890 OTHER SPECIFIED POSTPROCEDURAL STATES: Chronic | ICD-10-CM

## 2020-10-01 DIAGNOSIS — Z90.49 ACQUIRED ABSENCE OF OTHER SPECIFIED PARTS OF DIGESTIVE TRACT: Chronic | ICD-10-CM

## 2020-10-01 DIAGNOSIS — Z79.01 LONG TERM (CURRENT) USE OF ANTICOAGULANTS: ICD-10-CM

## 2020-10-01 DIAGNOSIS — I48.91 UNSPECIFIED ATRIAL FIBRILLATION: ICD-10-CM

## 2020-10-01 LAB
INR PPP: 2.8 RATIO
POCT-PROTHROMBIN TIME: 33.1 SECS
QUALITY CONTROL: YES

## 2020-10-30 ENCOUNTER — OUTPATIENT (OUTPATIENT)
Dept: OUTPATIENT SERVICES | Facility: HOSPITAL | Age: 75
LOS: 1 days | Discharge: HOME | End: 2020-10-30

## 2020-10-30 ENCOUNTER — APPOINTMENT (OUTPATIENT)
Dept: MEDICATION MANAGEMENT | Facility: CLINIC | Age: 75
End: 2020-10-30

## 2020-10-30 VITALS — OXYGEN SATURATION: 97 % | HEART RATE: 75 BPM | RESPIRATION RATE: 16 BRPM

## 2020-10-30 DIAGNOSIS — Z90.49 ACQUIRED ABSENCE OF OTHER SPECIFIED PARTS OF DIGESTIVE TRACT: Chronic | ICD-10-CM

## 2020-10-30 DIAGNOSIS — I48.91 UNSPECIFIED ATRIAL FIBRILLATION: ICD-10-CM

## 2020-10-30 DIAGNOSIS — Z98.890 OTHER SPECIFIED POSTPROCEDURAL STATES: Chronic | ICD-10-CM

## 2020-10-30 DIAGNOSIS — Z79.01 LONG TERM (CURRENT) USE OF ANTICOAGULANTS: ICD-10-CM

## 2020-10-30 LAB
INR PPP: 3.3 RATIO
POCT-PROTHROMBIN TIME: 39 SECS
QUALITY CONTROL: YES

## 2020-11-13 ENCOUNTER — OUTPATIENT (OUTPATIENT)
Dept: OUTPATIENT SERVICES | Facility: HOSPITAL | Age: 75
LOS: 1 days | Discharge: HOME | End: 2020-11-13

## 2020-11-13 ENCOUNTER — APPOINTMENT (OUTPATIENT)
Dept: MEDICATION MANAGEMENT | Facility: CLINIC | Age: 75
End: 2020-11-13

## 2020-11-13 VITALS — OXYGEN SATURATION: 97 % | HEART RATE: 75 BPM | RESPIRATION RATE: 16 BRPM

## 2020-11-13 DIAGNOSIS — Z90.49 ACQUIRED ABSENCE OF OTHER SPECIFIED PARTS OF DIGESTIVE TRACT: Chronic | ICD-10-CM

## 2020-11-13 DIAGNOSIS — I48.91 UNSPECIFIED ATRIAL FIBRILLATION: ICD-10-CM

## 2020-11-13 DIAGNOSIS — Z79.01 LONG TERM (CURRENT) USE OF ANTICOAGULANTS: ICD-10-CM

## 2020-11-13 DIAGNOSIS — Z98.890 OTHER SPECIFIED POSTPROCEDURAL STATES: Chronic | ICD-10-CM

## 2020-11-13 LAB
INR PPP: 2.4 RATIO
POCT-PROTHROMBIN TIME: 28.6 SECS
QUALITY CONTROL: YES

## 2020-12-11 ENCOUNTER — OUTPATIENT (OUTPATIENT)
Dept: OUTPATIENT SERVICES | Facility: HOSPITAL | Age: 75
LOS: 1 days | Discharge: HOME | End: 2020-12-11

## 2020-12-11 ENCOUNTER — APPOINTMENT (OUTPATIENT)
Dept: MEDICATION MANAGEMENT | Facility: CLINIC | Age: 75
End: 2020-12-11

## 2020-12-11 VITALS — OXYGEN SATURATION: 97 % | HEART RATE: 75 BPM | RESPIRATION RATE: 16 BRPM

## 2020-12-11 DIAGNOSIS — Z79.01 LONG TERM (CURRENT) USE OF ANTICOAGULANTS: ICD-10-CM

## 2020-12-11 DIAGNOSIS — Z98.890 OTHER SPECIFIED POSTPROCEDURAL STATES: Chronic | ICD-10-CM

## 2020-12-11 DIAGNOSIS — I48.91 UNSPECIFIED ATRIAL FIBRILLATION: ICD-10-CM

## 2020-12-11 DIAGNOSIS — Z90.49 ACQUIRED ABSENCE OF OTHER SPECIFIED PARTS OF DIGESTIVE TRACT: Chronic | ICD-10-CM

## 2020-12-11 LAB
INR PPP: 2.8 RATIO
POCT-PROTHROMBIN TIME: 28.8 SECS
QUALITY CONTROL: YES

## 2021-01-08 ENCOUNTER — APPOINTMENT (OUTPATIENT)
Dept: MEDICATION MANAGEMENT | Facility: CLINIC | Age: 76
End: 2021-01-08

## 2021-01-08 ENCOUNTER — OUTPATIENT (OUTPATIENT)
Dept: OUTPATIENT SERVICES | Facility: HOSPITAL | Age: 76
LOS: 1 days | Discharge: HOME | End: 2021-01-08

## 2021-01-08 VITALS — RESPIRATION RATE: 16 BRPM | HEART RATE: 75 BPM | OXYGEN SATURATION: 98 %

## 2021-01-08 DIAGNOSIS — I48.91 UNSPECIFIED ATRIAL FIBRILLATION: ICD-10-CM

## 2021-01-08 DIAGNOSIS — Z79.01 LONG TERM (CURRENT) USE OF ANTICOAGULANTS: ICD-10-CM

## 2021-01-08 DIAGNOSIS — Z90.49 ACQUIRED ABSENCE OF OTHER SPECIFIED PARTS OF DIGESTIVE TRACT: Chronic | ICD-10-CM

## 2021-01-08 DIAGNOSIS — Z98.890 OTHER SPECIFIED POSTPROCEDURAL STATES: Chronic | ICD-10-CM

## 2021-01-08 LAB
INR PPP: 3.6 RATIO
POCT-PROTHROMBIN TIME: 43.4 SECS
QUALITY CONTROL: YES

## 2021-02-05 ENCOUNTER — APPOINTMENT (OUTPATIENT)
Dept: MEDICATION MANAGEMENT | Facility: CLINIC | Age: 76
End: 2021-02-05

## 2021-02-12 ENCOUNTER — OUTPATIENT (OUTPATIENT)
Dept: OUTPATIENT SERVICES | Facility: HOSPITAL | Age: 76
LOS: 1 days | Discharge: HOME | End: 2021-02-12

## 2021-02-12 ENCOUNTER — APPOINTMENT (OUTPATIENT)
Dept: MEDICATION MANAGEMENT | Facility: CLINIC | Age: 76
End: 2021-02-12

## 2021-02-12 VITALS — RESPIRATION RATE: 16 BRPM | OXYGEN SATURATION: 98 % | HEART RATE: 75 BPM

## 2021-02-12 DIAGNOSIS — Z98.890 OTHER SPECIFIED POSTPROCEDURAL STATES: Chronic | ICD-10-CM

## 2021-02-12 DIAGNOSIS — Z79.01 LONG TERM (CURRENT) USE OF ANTICOAGULANTS: ICD-10-CM

## 2021-02-12 DIAGNOSIS — I48.91 UNSPECIFIED ATRIAL FIBRILLATION: ICD-10-CM

## 2021-02-12 DIAGNOSIS — Z90.49 ACQUIRED ABSENCE OF OTHER SPECIFIED PARTS OF DIGESTIVE TRACT: Chronic | ICD-10-CM

## 2021-02-12 LAB
INR PPP: 2.7 RATIO
POCT-PROTHROMBIN TIME: 32.4 SECS
QUALITY CONTROL: YES

## 2021-03-12 ENCOUNTER — APPOINTMENT (OUTPATIENT)
Dept: MEDICATION MANAGEMENT | Facility: CLINIC | Age: 76
End: 2021-03-12

## 2021-03-12 ENCOUNTER — OUTPATIENT (OUTPATIENT)
Dept: OUTPATIENT SERVICES | Facility: HOSPITAL | Age: 76
LOS: 1 days | Discharge: HOME | End: 2021-03-12

## 2021-03-12 VITALS — HEART RATE: 76 BPM | RESPIRATION RATE: 16 BRPM | OXYGEN SATURATION: 98 %

## 2021-03-12 DIAGNOSIS — Z98.890 OTHER SPECIFIED POSTPROCEDURAL STATES: Chronic | ICD-10-CM

## 2021-03-12 DIAGNOSIS — I48.91 UNSPECIFIED ATRIAL FIBRILLATION: ICD-10-CM

## 2021-03-12 DIAGNOSIS — Z90.49 ACQUIRED ABSENCE OF OTHER SPECIFIED PARTS OF DIGESTIVE TRACT: Chronic | ICD-10-CM

## 2021-03-12 DIAGNOSIS — Z79.01 LONG TERM (CURRENT) USE OF ANTICOAGULANTS: ICD-10-CM

## 2021-03-12 LAB
INR PPP: 2.1 RATIO
POCT-PROTHROMBIN TIME: 25.4 SECS
QUALITY CONTROL: YES

## 2021-03-29 ENCOUNTER — APPOINTMENT (OUTPATIENT)
Dept: CARDIOLOGY | Facility: CLINIC | Age: 76
End: 2021-03-29
Payer: MEDICARE

## 2021-03-29 VITALS — TEMPERATURE: 98.7 F | DIASTOLIC BLOOD PRESSURE: 85 MMHG | SYSTOLIC BLOOD PRESSURE: 120 MMHG | HEART RATE: 72 BPM

## 2021-03-29 VITALS — HEIGHT: 66 IN | WEIGHT: 205 LBS | BODY MASS INDEX: 32.95 KG/M2

## 2021-03-29 PROCEDURE — 93000 ELECTROCARDIOGRAM COMPLETE: CPT

## 2021-03-29 PROCEDURE — 99072 ADDL SUPL MATRL&STAF TM PHE: CPT

## 2021-03-29 PROCEDURE — 99214 OFFICE O/P EST MOD 30 MIN: CPT

## 2021-03-29 NOTE — PHYSICAL EXAM
[General Appearance - Well Developed] : well developed [General Appearance - Well Nourished] : well nourished [General Appearance - In No Acute Distress] : no acute distress [Normal Conjunctiva] : the conjunctiva exhibited no abnormalities [Normal Oropharynx] : normal oropharynx [Exaggerated Use Of Accessory Muscles For Inspiration] : no accessory muscle use [Chest Palpation] : palpation of the chest revealed no abnormalities [Heart Rate And Rhythm] : heart rate and rhythm were normal [Heart Sounds] : normal S1 and S2 [Arterial Pulses Normal] : the arterial pulses were normal [Edema] : no peripheral edema present [Bowel Sounds] : normal bowel sounds [Abdomen Soft] : soft [Abdomen Tenderness] : non-tender [Abnormal Walk] : normal gait [Nail Clubbing] : no clubbing of the fingernails [Cyanosis, Localized] : no localized cyanosis [Petechial Hemorrhages (___cm)] : no petechial hemorrhages [Skin Color & Pigmentation] : normal skin color and pigmentation [Skin Turgor] : normal skin turgor [] : no rash

## 2021-03-29 NOTE — DISCUSSION/SUMMARY
[FreeTextEntry1] : INR levels are followed in the Coumadin clinic.\par Patient was instructed to target their T. Cholesterol to less than 200 mg/dl and LDL cholesterol to less than 70 mg/dl.\par Exercise and weight loss was advised.\par Maintain cardiac medications. \par Patient was advised to repeat a BMP, CBC , fasting lipid profile and hepatic panel.\par He will repeat a 2D echo doppler prior to the next visit.\par RV in 6 months.\par Patient is scheduled to see his vascular surgeon, Sharon in the next few weeks.\par

## 2021-04-08 NOTE — ASU PATIENT PROFILE, ADULT - PAIN SCALE PREFERRED, PROFILE
Immunization chart prep completed. Immunization records verified. Jasmina Goode is following the unified vaccine schedule and is due for the following.   Health Maintenance Due   Topic Date Due   • Annual Physical (ages 3-18)  10/26/2019   • IPV Vaccine (4 of 4 - 4-dose series) 10/26/2020   • MMR Vaccine (2 of 2 - Standard series) 10/26/2020   • Varicella Vaccine (2 of 2 - 2-dose childhood series) 10/26/2020   • DTaP/Tdap/Td Vaccine (5 - DTaP) 10/26/2020        numerical 0-10

## 2021-04-09 ENCOUNTER — OUTPATIENT (OUTPATIENT)
Dept: OUTPATIENT SERVICES | Facility: HOSPITAL | Age: 76
LOS: 1 days | Discharge: HOME | End: 2021-04-09

## 2021-04-09 ENCOUNTER — APPOINTMENT (OUTPATIENT)
Dept: MEDICATION MANAGEMENT | Facility: CLINIC | Age: 76
End: 2021-04-09

## 2021-04-09 VITALS — OXYGEN SATURATION: 98 % | HEART RATE: 72 BPM | RESPIRATION RATE: 16 BRPM

## 2021-04-09 DIAGNOSIS — Z98.890 OTHER SPECIFIED POSTPROCEDURAL STATES: Chronic | ICD-10-CM

## 2021-04-09 DIAGNOSIS — Z90.49 ACQUIRED ABSENCE OF OTHER SPECIFIED PARTS OF DIGESTIVE TRACT: Chronic | ICD-10-CM

## 2021-04-09 DIAGNOSIS — Z79.01 LONG TERM (CURRENT) USE OF ANTICOAGULANTS: ICD-10-CM

## 2021-04-09 DIAGNOSIS — I48.91 UNSPECIFIED ATRIAL FIBRILLATION: ICD-10-CM

## 2021-04-09 LAB
INR PPP: 2.6 RATIO
POCT-PROTHROMBIN TIME: 43.5 SECS
QUALITY CONTROL: YES

## 2021-05-14 ENCOUNTER — APPOINTMENT (OUTPATIENT)
Dept: MEDICATION MANAGEMENT | Facility: CLINIC | Age: 76
End: 2021-05-14

## 2021-05-14 ENCOUNTER — OUTPATIENT (OUTPATIENT)
Dept: OUTPATIENT SERVICES | Facility: HOSPITAL | Age: 76
LOS: 1 days | Discharge: HOME | End: 2021-05-14

## 2021-05-14 VITALS — OXYGEN SATURATION: 99 % | RESPIRATION RATE: 16 BRPM | HEART RATE: 75 BPM

## 2021-05-14 DIAGNOSIS — Z90.49 ACQUIRED ABSENCE OF OTHER SPECIFIED PARTS OF DIGESTIVE TRACT: Chronic | ICD-10-CM

## 2021-05-14 DIAGNOSIS — Z98.890 OTHER SPECIFIED POSTPROCEDURAL STATES: Chronic | ICD-10-CM

## 2021-05-14 DIAGNOSIS — Z79.01 LONG TERM (CURRENT) USE OF ANTICOAGULANTS: ICD-10-CM

## 2021-05-14 DIAGNOSIS — I48.91 UNSPECIFIED ATRIAL FIBRILLATION: ICD-10-CM

## 2021-05-14 LAB
INR PPP: 3 RATIO
POCT-PROTHROMBIN TIME: 35.9 SECS
QUALITY CONTROL: YES

## 2021-06-18 ENCOUNTER — OUTPATIENT (OUTPATIENT)
Dept: OUTPATIENT SERVICES | Facility: HOSPITAL | Age: 76
LOS: 1 days | Discharge: HOME | End: 2021-06-18

## 2021-06-18 ENCOUNTER — APPOINTMENT (OUTPATIENT)
Dept: MEDICATION MANAGEMENT | Facility: CLINIC | Age: 76
End: 2021-06-18

## 2021-06-18 DIAGNOSIS — I48.91 UNSPECIFIED ATRIAL FIBRILLATION: ICD-10-CM

## 2021-06-18 DIAGNOSIS — Z90.49 ACQUIRED ABSENCE OF OTHER SPECIFIED PARTS OF DIGESTIVE TRACT: Chronic | ICD-10-CM

## 2021-06-18 DIAGNOSIS — Z79.01 LONG TERM (CURRENT) USE OF ANTICOAGULANTS: ICD-10-CM

## 2021-06-18 DIAGNOSIS — Z98.890 OTHER SPECIFIED POSTPROCEDURAL STATES: Chronic | ICD-10-CM

## 2021-06-18 LAB
INR PPP: 1.8 RATIO
POCT-PROTHROMBIN TIME: 21.4 SECS
QUALITY CONTROL: YES

## 2021-06-25 ENCOUNTER — OUTPATIENT (OUTPATIENT)
Dept: OUTPATIENT SERVICES | Facility: HOSPITAL | Age: 76
LOS: 1 days | Discharge: HOME | End: 2021-06-25

## 2021-06-25 ENCOUNTER — APPOINTMENT (OUTPATIENT)
Dept: MEDICATION MANAGEMENT | Facility: CLINIC | Age: 76
End: 2021-06-25

## 2021-06-25 VITALS — OXYGEN SATURATION: 99 % | RESPIRATION RATE: 16 BRPM | HEART RATE: 73 BPM

## 2021-06-25 DIAGNOSIS — Z90.49 ACQUIRED ABSENCE OF OTHER SPECIFIED PARTS OF DIGESTIVE TRACT: Chronic | ICD-10-CM

## 2021-06-25 DIAGNOSIS — Z98.890 OTHER SPECIFIED POSTPROCEDURAL STATES: Chronic | ICD-10-CM

## 2021-06-25 DIAGNOSIS — Z79.01 LONG TERM (CURRENT) USE OF ANTICOAGULANTS: ICD-10-CM

## 2021-06-25 DIAGNOSIS — I48.91 UNSPECIFIED ATRIAL FIBRILLATION: ICD-10-CM

## 2021-06-25 LAB
INR PPP: 2.7 RATIO
POCT-PROTHROMBIN TIME: 32.2 SECS
QUALITY CONTROL: YES

## 2021-07-30 ENCOUNTER — OUTPATIENT (OUTPATIENT)
Dept: OUTPATIENT SERVICES | Facility: HOSPITAL | Age: 76
LOS: 1 days | Discharge: HOME | End: 2021-07-30

## 2021-07-30 ENCOUNTER — APPOINTMENT (OUTPATIENT)
Dept: MEDICATION MANAGEMENT | Facility: CLINIC | Age: 76
End: 2021-07-30

## 2021-07-30 VITALS — RESPIRATION RATE: 16 BRPM | OXYGEN SATURATION: 99 % | HEART RATE: 75 BPM

## 2021-07-30 DIAGNOSIS — Z98.890 OTHER SPECIFIED POSTPROCEDURAL STATES: Chronic | ICD-10-CM

## 2021-07-30 DIAGNOSIS — I48.91 UNSPECIFIED ATRIAL FIBRILLATION: ICD-10-CM

## 2021-07-30 DIAGNOSIS — Z79.01 LONG TERM (CURRENT) USE OF ANTICOAGULANTS: ICD-10-CM

## 2021-07-30 DIAGNOSIS — Z90.49 ACQUIRED ABSENCE OF OTHER SPECIFIED PARTS OF DIGESTIVE TRACT: Chronic | ICD-10-CM

## 2021-07-30 LAB
INR PPP: 3.8 RATIO
POCT-PROTHROMBIN TIME: 46 SECS
QUALITY CONTROL: YES

## 2021-08-06 ENCOUNTER — OUTPATIENT (OUTPATIENT)
Dept: OUTPATIENT SERVICES | Facility: HOSPITAL | Age: 76
LOS: 1 days | Discharge: HOME | End: 2021-08-06

## 2021-08-06 ENCOUNTER — APPOINTMENT (OUTPATIENT)
Dept: MEDICATION MANAGEMENT | Facility: CLINIC | Age: 76
End: 2021-08-06

## 2021-08-06 VITALS — RESPIRATION RATE: 16 BRPM | OXYGEN SATURATION: 95 % | HEART RATE: 58 BPM

## 2021-08-06 DIAGNOSIS — Z79.01 LONG TERM (CURRENT) USE OF ANTICOAGULANTS: ICD-10-CM

## 2021-08-06 DIAGNOSIS — I48.91 UNSPECIFIED ATRIAL FIBRILLATION: ICD-10-CM

## 2021-08-06 DIAGNOSIS — Z90.49 ACQUIRED ABSENCE OF OTHER SPECIFIED PARTS OF DIGESTIVE TRACT: Chronic | ICD-10-CM

## 2021-08-06 DIAGNOSIS — Z98.890 OTHER SPECIFIED POSTPROCEDURAL STATES: Chronic | ICD-10-CM

## 2021-08-06 LAB
INR PPP: 2.1 RATIO
POCT-PROTHROMBIN TIME: 25.7 SECS
QUALITY CONTROL: YES

## 2021-09-10 ENCOUNTER — APPOINTMENT (OUTPATIENT)
Dept: MEDICATION MANAGEMENT | Facility: CLINIC | Age: 76
End: 2021-09-10

## 2021-09-10 ENCOUNTER — OUTPATIENT (OUTPATIENT)
Dept: OUTPATIENT SERVICES | Facility: HOSPITAL | Age: 76
LOS: 1 days | Discharge: HOME | End: 2021-09-10

## 2021-09-10 VITALS — OXYGEN SATURATION: 99 % | HEART RATE: 64 BPM | RESPIRATION RATE: 16 BRPM

## 2021-09-10 DIAGNOSIS — Z90.49 ACQUIRED ABSENCE OF OTHER SPECIFIED PARTS OF DIGESTIVE TRACT: Chronic | ICD-10-CM

## 2021-09-10 DIAGNOSIS — Z98.890 OTHER SPECIFIED POSTPROCEDURAL STATES: Chronic | ICD-10-CM

## 2021-09-10 DIAGNOSIS — I48.91 UNSPECIFIED ATRIAL FIBRILLATION: ICD-10-CM

## 2021-09-10 DIAGNOSIS — Z79.01 LONG TERM (CURRENT) USE OF ANTICOAGULANTS: ICD-10-CM

## 2021-09-10 LAB
INR PPP: 2.2 RATIO
POCT-PROTHROMBIN TIME: 25.8 SECS
QUALITY CONTROL: YES

## 2021-09-20 ENCOUNTER — APPOINTMENT (OUTPATIENT)
Dept: CARDIOLOGY | Facility: CLINIC | Age: 76
End: 2021-09-20
Payer: MEDICARE

## 2021-09-20 PROCEDURE — 93306 TTE W/DOPPLER COMPLETE: CPT

## 2021-09-27 ENCOUNTER — APPOINTMENT (OUTPATIENT)
Dept: CARDIOLOGY | Facility: CLINIC | Age: 76
End: 2021-09-27
Payer: MEDICARE

## 2021-09-27 VITALS
WEIGHT: 195 LBS | TEMPERATURE: 98.7 F | HEART RATE: 72 BPM | DIASTOLIC BLOOD PRESSURE: 80 MMHG | SYSTOLIC BLOOD PRESSURE: 115 MMHG | HEIGHT: 66 IN | BODY MASS INDEX: 31.34 KG/M2

## 2021-09-27 PROCEDURE — 93000 ELECTROCARDIOGRAM COMPLETE: CPT

## 2021-09-27 PROCEDURE — 99214 OFFICE O/P EST MOD 30 MIN: CPT

## 2021-09-27 NOTE — REASON FOR VISIT
[FreeTextEntry1] : Patient presents for a follow up visit and review of his 2D echo doppler results.

## 2021-09-27 NOTE — DISCUSSION/SUMMARY
[FreeTextEntry1] : INR levels are followed in the Coumadin clinic.\par Patient was instructed to target his T. Cholesterol to less than 200 mg/dl and LDL cholesterol to less than 70 mg/dl.\par Exercise and weight loss was advised.\par Maintain cardiac medications. \par Patient was advised to repeat a BMP, CBC , fasting lipid profile and hepatic panel.\par His repeat  2D echo doppler results were reviewed in detail with him and his spouse. A copy of the results was provided.\par RV in 6 months.\par Patient is scheduled to see his vascular surgeon, Sharon in the next few weeks.\par

## 2021-09-27 NOTE — HISTORY OF PRESENT ILLNESS
[FreeTextEntry1] : PVD\par Cigarette smoker\par AAA\par Moderate LV systolic dysfunction\par LV thrombus with LV aneurysm\par Hyperlipidemia \par MR\par TR

## 2021-10-15 ENCOUNTER — OUTPATIENT (OUTPATIENT)
Dept: OUTPATIENT SERVICES | Facility: HOSPITAL | Age: 76
LOS: 1 days | Discharge: HOME | End: 2021-10-15

## 2021-10-15 ENCOUNTER — APPOINTMENT (OUTPATIENT)
Dept: MEDICATION MANAGEMENT | Facility: CLINIC | Age: 76
End: 2021-10-15

## 2021-10-15 VITALS — RESPIRATION RATE: 16 BRPM | HEART RATE: 75 BPM | OXYGEN SATURATION: 99 %

## 2021-10-15 DIAGNOSIS — Z98.890 OTHER SPECIFIED POSTPROCEDURAL STATES: Chronic | ICD-10-CM

## 2021-10-15 DIAGNOSIS — Z90.49 ACQUIRED ABSENCE OF OTHER SPECIFIED PARTS OF DIGESTIVE TRACT: Chronic | ICD-10-CM

## 2021-10-15 DIAGNOSIS — Z79.01 LONG TERM (CURRENT) USE OF ANTICOAGULANTS: ICD-10-CM

## 2021-10-15 DIAGNOSIS — I48.91 UNSPECIFIED ATRIAL FIBRILLATION: ICD-10-CM

## 2021-10-15 LAB
INR PPP: 2.2 RATIO
POCT-PROTHROMBIN TIME: 26.1 SECS
QUALITY CONTROL: YES

## 2021-11-19 ENCOUNTER — APPOINTMENT (OUTPATIENT)
Dept: MEDICATION MANAGEMENT | Facility: CLINIC | Age: 76
End: 2021-11-19

## 2021-11-19 ENCOUNTER — OUTPATIENT (OUTPATIENT)
Dept: OUTPATIENT SERVICES | Facility: HOSPITAL | Age: 76
LOS: 1 days | Discharge: HOME | End: 2021-11-19

## 2021-11-19 VITALS — OXYGEN SATURATION: 99 % | HEART RATE: 69 BPM | RESPIRATION RATE: 16 BRPM

## 2021-11-19 DIAGNOSIS — Z90.49 ACQUIRED ABSENCE OF OTHER SPECIFIED PARTS OF DIGESTIVE TRACT: Chronic | ICD-10-CM

## 2021-11-19 DIAGNOSIS — Z79.01 LONG TERM (CURRENT) USE OF ANTICOAGULANTS: ICD-10-CM

## 2021-11-19 DIAGNOSIS — Z98.890 OTHER SPECIFIED POSTPROCEDURAL STATES: Chronic | ICD-10-CM

## 2021-11-19 DIAGNOSIS — I48.91 UNSPECIFIED ATRIAL FIBRILLATION: ICD-10-CM

## 2021-11-19 LAB
INR PPP: 2.6 RATIO
POCT-PROTHROMBIN TIME: 30.6 SECS
QUALITY CONTROL: YES

## 2022-01-14 ENCOUNTER — OUTPATIENT (OUTPATIENT)
Dept: OUTPATIENT SERVICES | Facility: HOSPITAL | Age: 77
LOS: 1 days | Discharge: HOME | End: 2022-01-14

## 2022-01-14 ENCOUNTER — APPOINTMENT (OUTPATIENT)
Dept: MEDICATION MANAGEMENT | Facility: CLINIC | Age: 77
End: 2022-01-14

## 2022-01-14 VITALS — OXYGEN SATURATION: 99 % | HEART RATE: 86 BPM | RESPIRATION RATE: 16 BRPM

## 2022-01-14 DIAGNOSIS — Z79.01 LONG TERM (CURRENT) USE OF ANTICOAGULANTS: ICD-10-CM

## 2022-01-14 DIAGNOSIS — I48.91 UNSPECIFIED ATRIAL FIBRILLATION: ICD-10-CM

## 2022-01-14 DIAGNOSIS — Z98.890 OTHER SPECIFIED POSTPROCEDURAL STATES: Chronic | ICD-10-CM

## 2022-01-14 DIAGNOSIS — Z90.49 ACQUIRED ABSENCE OF OTHER SPECIFIED PARTS OF DIGESTIVE TRACT: Chronic | ICD-10-CM

## 2022-01-14 LAB
INR PPP: 2.3 RATIO
POCT-PROTHROMBIN TIME: 27.8 SECS
QUALITY CONTROL: YES

## 2022-02-18 ENCOUNTER — OUTPATIENT (OUTPATIENT)
Dept: OUTPATIENT SERVICES | Facility: HOSPITAL | Age: 77
LOS: 1 days | Discharge: HOME | End: 2022-02-18

## 2022-02-18 ENCOUNTER — APPOINTMENT (OUTPATIENT)
Dept: MEDICATION MANAGEMENT | Facility: CLINIC | Age: 77
End: 2022-02-18

## 2022-02-18 DIAGNOSIS — I48.91 UNSPECIFIED ATRIAL FIBRILLATION: ICD-10-CM

## 2022-02-18 DIAGNOSIS — Z98.890 OTHER SPECIFIED POSTPROCEDURAL STATES: Chronic | ICD-10-CM

## 2022-02-18 DIAGNOSIS — Z90.49 ACQUIRED ABSENCE OF OTHER SPECIFIED PARTS OF DIGESTIVE TRACT: Chronic | ICD-10-CM

## 2022-02-18 DIAGNOSIS — Z79.01 LONG TERM (CURRENT) USE OF ANTICOAGULANTS: ICD-10-CM

## 2022-02-18 LAB
INR PPP: 1.8 RATIO
POCT-PROTHROMBIN TIME: 22.1 SECS
QUALITY CONTROL: YES

## 2022-03-18 ENCOUNTER — OUTPATIENT (OUTPATIENT)
Dept: OUTPATIENT SERVICES | Facility: HOSPITAL | Age: 77
LOS: 1 days | Discharge: HOME | End: 2022-03-18

## 2022-03-18 ENCOUNTER — APPOINTMENT (OUTPATIENT)
Dept: MEDICATION MANAGEMENT | Facility: CLINIC | Age: 77
End: 2022-03-18

## 2022-03-18 VITALS — OXYGEN SATURATION: 99 % | RESPIRATION RATE: 16 BRPM | HEART RATE: 80 BPM

## 2022-03-18 DIAGNOSIS — Z79.01 LONG TERM (CURRENT) USE OF ANTICOAGULANTS: ICD-10-CM

## 2022-03-18 DIAGNOSIS — I48.91 UNSPECIFIED ATRIAL FIBRILLATION: ICD-10-CM

## 2022-03-18 DIAGNOSIS — Z98.890 OTHER SPECIFIED POSTPROCEDURAL STATES: Chronic | ICD-10-CM

## 2022-03-18 DIAGNOSIS — Z90.49 ACQUIRED ABSENCE OF OTHER SPECIFIED PARTS OF DIGESTIVE TRACT: Chronic | ICD-10-CM

## 2022-03-18 LAB
INR PPP: 1.7 RATIO
POCT-PROTHROMBIN TIME: 20.5 SECS
QUALITY CONTROL: YES

## 2022-03-21 ENCOUNTER — APPOINTMENT (OUTPATIENT)
Dept: CARDIOLOGY | Facility: CLINIC | Age: 77
End: 2022-03-21
Payer: MEDICARE

## 2022-03-21 VITALS
BODY MASS INDEX: 30.86 KG/M2 | SYSTOLIC BLOOD PRESSURE: 122 MMHG | HEIGHT: 66 IN | DIASTOLIC BLOOD PRESSURE: 42 MMHG | WEIGHT: 192 LBS | TEMPERATURE: 97.2 F | HEART RATE: 85 BPM

## 2022-03-21 PROCEDURE — 99214 OFFICE O/P EST MOD 30 MIN: CPT

## 2022-03-21 PROCEDURE — 93000 ELECTROCARDIOGRAM COMPLETE: CPT

## 2022-03-21 RX ORDER — BUSPIRONE HYDROCHLORIDE 10 MG/1
10 TABLET ORAL
Refills: 0 | Status: DISCONTINUED | COMMUNITY
End: 2022-03-21

## 2022-03-21 NOTE — DISCUSSION/SUMMARY
[FreeTextEntry1] : INR levels are followed in the Coumadin clinic.\par Patient was instructed to target his T. Cholesterol to less than 200 mg/dl and LDL cholesterol to less than 70 mg/dl.\par Exercise and weight loss was advised.\par Maintain cardiac medications. \par Patient was advised to repeat a BMP, CBC , fasting lipid profile and hepatic panel.\par His repeat  2D echo doppler results were reviewed in detail with him and his spouse. A copy of the results was provided on his prior office visit.\par RV in 6 months.\par Patient is advised to see his vascular surgeon, Sharon in the next few weeks. He has not done so as was advised on his last office visit. It has been 2 years since his prior evaluation.\par

## 2022-03-21 NOTE — REASON FOR VISIT
[FreeTextEntry1] : Patient presents for a follow up visit. He did not go for lab testing. He did not see his Vascular surgeon Dr. Herrera for follow up of his AAA.

## 2022-04-01 ENCOUNTER — APPOINTMENT (OUTPATIENT)
Dept: MEDICATION MANAGEMENT | Facility: CLINIC | Age: 77
End: 2022-04-01

## 2022-04-01 ENCOUNTER — OUTPATIENT (OUTPATIENT)
Dept: OUTPATIENT SERVICES | Facility: HOSPITAL | Age: 77
LOS: 1 days | Discharge: HOME | End: 2022-04-01

## 2022-04-01 VITALS — OXYGEN SATURATION: 99 % | RESPIRATION RATE: 16 BRPM | HEART RATE: 73 BPM

## 2022-04-01 DIAGNOSIS — Z98.890 OTHER SPECIFIED POSTPROCEDURAL STATES: Chronic | ICD-10-CM

## 2022-04-01 DIAGNOSIS — I48.91 UNSPECIFIED ATRIAL FIBRILLATION: ICD-10-CM

## 2022-04-01 DIAGNOSIS — Z90.49 ACQUIRED ABSENCE OF OTHER SPECIFIED PARTS OF DIGESTIVE TRACT: Chronic | ICD-10-CM

## 2022-04-01 DIAGNOSIS — Z79.01 LONG TERM (CURRENT) USE OF ANTICOAGULANTS: ICD-10-CM

## 2022-04-01 LAB
INR PPP: 2.3 RATIO
POCT-PROTHROMBIN TIME: 28 SECS
QUALITY CONTROL: YES

## 2022-04-29 ENCOUNTER — APPOINTMENT (OUTPATIENT)
Dept: MEDICATION MANAGEMENT | Facility: CLINIC | Age: 77
End: 2022-04-29

## 2022-04-29 ENCOUNTER — OUTPATIENT (OUTPATIENT)
Dept: OUTPATIENT SERVICES | Facility: HOSPITAL | Age: 77
LOS: 1 days | Discharge: HOME | End: 2022-04-29

## 2022-04-29 DIAGNOSIS — Z90.49 ACQUIRED ABSENCE OF OTHER SPECIFIED PARTS OF DIGESTIVE TRACT: Chronic | ICD-10-CM

## 2022-04-29 DIAGNOSIS — Z98.890 OTHER SPECIFIED POSTPROCEDURAL STATES: Chronic | ICD-10-CM

## 2022-04-29 DIAGNOSIS — Z79.01 LONG TERM (CURRENT) USE OF ANTICOAGULANTS: ICD-10-CM

## 2022-04-29 DIAGNOSIS — I48.91 UNSPECIFIED ATRIAL FIBRILLATION: ICD-10-CM

## 2022-04-29 LAB
INR PPP: 2.8 RATIO
POCT-PROTHROMBIN TIME: 33.2 SECS
QUALITY CONTROL: YES

## 2022-06-03 ENCOUNTER — APPOINTMENT (OUTPATIENT)
Dept: MEDICATION MANAGEMENT | Facility: CLINIC | Age: 77
End: 2022-06-03

## 2022-06-03 ENCOUNTER — OUTPATIENT (OUTPATIENT)
Dept: OUTPATIENT SERVICES | Facility: HOSPITAL | Age: 77
LOS: 1 days | Discharge: HOME | End: 2022-06-03

## 2022-06-03 DIAGNOSIS — Z79.01 LONG TERM (CURRENT) USE OF ANTICOAGULANTS: ICD-10-CM

## 2022-06-03 DIAGNOSIS — I48.91 UNSPECIFIED ATRIAL FIBRILLATION: ICD-10-CM

## 2022-06-03 DIAGNOSIS — Z90.49 ACQUIRED ABSENCE OF OTHER SPECIFIED PARTS OF DIGESTIVE TRACT: Chronic | ICD-10-CM

## 2022-06-03 DIAGNOSIS — Z98.890 OTHER SPECIFIED POSTPROCEDURAL STATES: Chronic | ICD-10-CM

## 2022-06-03 LAB
INR PPP: 1.8 RATIO
POCT-PROTHROMBIN TIME: 21.8 SECS
QUALITY CONTROL: YES

## 2022-06-17 ENCOUNTER — OUTPATIENT (OUTPATIENT)
Dept: OUTPATIENT SERVICES | Facility: HOSPITAL | Age: 77
LOS: 1 days | Discharge: HOME | End: 2022-06-17

## 2022-06-17 ENCOUNTER — APPOINTMENT (OUTPATIENT)
Dept: MEDICATION MANAGEMENT | Facility: CLINIC | Age: 77
End: 2022-06-17

## 2022-06-17 DIAGNOSIS — I48.91 UNSPECIFIED ATRIAL FIBRILLATION: ICD-10-CM

## 2022-06-17 DIAGNOSIS — Z90.49 ACQUIRED ABSENCE OF OTHER SPECIFIED PARTS OF DIGESTIVE TRACT: Chronic | ICD-10-CM

## 2022-06-17 DIAGNOSIS — Z98.890 OTHER SPECIFIED POSTPROCEDURAL STATES: Chronic | ICD-10-CM

## 2022-06-17 DIAGNOSIS — Z79.01 LONG TERM (CURRENT) USE OF ANTICOAGULANTS: ICD-10-CM

## 2022-06-17 LAB
INR PPP: 2.3 RATIO
POCT-PROTHROMBIN TIME: 27.3 SECS
QUALITY CONTROL: YES

## 2022-07-15 ENCOUNTER — OUTPATIENT (OUTPATIENT)
Dept: OUTPATIENT SERVICES | Facility: HOSPITAL | Age: 77
LOS: 1 days | Discharge: HOME | End: 2022-07-15

## 2022-07-15 ENCOUNTER — APPOINTMENT (OUTPATIENT)
Dept: MEDICATION MANAGEMENT | Facility: CLINIC | Age: 77
End: 2022-07-15

## 2022-07-15 DIAGNOSIS — Z90.49 ACQUIRED ABSENCE OF OTHER SPECIFIED PARTS OF DIGESTIVE TRACT: Chronic | ICD-10-CM

## 2022-07-15 DIAGNOSIS — I48.91 UNSPECIFIED ATRIAL FIBRILLATION: ICD-10-CM

## 2022-07-15 DIAGNOSIS — Z98.890 OTHER SPECIFIED POSTPROCEDURAL STATES: Chronic | ICD-10-CM

## 2022-07-15 DIAGNOSIS — Z79.01 LONG TERM (CURRENT) USE OF ANTICOAGULANTS: ICD-10-CM

## 2022-07-15 LAB
INR PPP: 2.6 RATIO
POCT-PROTHROMBIN TIME: 30.8 SECS
QUALITY CONTROL: YES

## 2022-08-19 ENCOUNTER — OUTPATIENT (OUTPATIENT)
Dept: OUTPATIENT SERVICES | Facility: HOSPITAL | Age: 77
LOS: 1 days | Discharge: HOME | End: 2022-08-19

## 2022-08-19 ENCOUNTER — APPOINTMENT (OUTPATIENT)
Dept: MEDICATION MANAGEMENT | Facility: CLINIC | Age: 77
End: 2022-08-19

## 2022-08-19 DIAGNOSIS — Z90.49 ACQUIRED ABSENCE OF OTHER SPECIFIED PARTS OF DIGESTIVE TRACT: Chronic | ICD-10-CM

## 2022-08-19 DIAGNOSIS — Z98.890 OTHER SPECIFIED POSTPROCEDURAL STATES: Chronic | ICD-10-CM

## 2022-08-19 DIAGNOSIS — Z79.01 LONG TERM (CURRENT) USE OF ANTICOAGULANTS: ICD-10-CM

## 2022-08-19 DIAGNOSIS — I48.91 UNSPECIFIED ATRIAL FIBRILLATION: ICD-10-CM

## 2022-08-19 LAB
INR PPP: 3.4 RATIO
POCT-PROTHROMBIN TIME: 41.3 SECS
QUALITY CONTROL: YES

## 2022-09-12 ENCOUNTER — APPOINTMENT (OUTPATIENT)
Dept: CARDIOLOGY | Facility: CLINIC | Age: 77
End: 2022-09-12

## 2022-09-12 ENCOUNTER — RESULT CHARGE (OUTPATIENT)
Age: 77
End: 2022-09-12

## 2022-09-12 VITALS
HEIGHT: 66 IN | DIASTOLIC BLOOD PRESSURE: 64 MMHG | TEMPERATURE: 97.4 F | BODY MASS INDEX: 31.66 KG/M2 | SYSTOLIC BLOOD PRESSURE: 110 MMHG | HEART RATE: 84 BPM | WEIGHT: 197 LBS

## 2022-09-12 PROCEDURE — 99214 OFFICE O/P EST MOD 30 MIN: CPT | Mod: 25

## 2022-09-12 PROCEDURE — 93000 ELECTROCARDIOGRAM COMPLETE: CPT

## 2022-09-12 RX ORDER — METFORMIN HYDROCHLORIDE 500 MG/1
500 TABLET, COATED ORAL DAILY
Refills: 0 | Status: ACTIVE | COMMUNITY

## 2022-09-12 RX ORDER — METOPROLOL SUCCINATE 100 MG/1
100 TABLET, EXTENDED RELEASE ORAL DAILY
Refills: 0 | Status: ACTIVE | COMMUNITY

## 2022-09-12 RX ORDER — WARFARIN 5 MG/1
5 TABLET ORAL
Refills: 0 | Status: DISCONTINUED | COMMUNITY
End: 2022-09-12

## 2022-09-12 RX ORDER — ASPIRIN 81 MG
81 TABLET, DELAYED RELEASE (ENTERIC COATED) ORAL DAILY
Refills: 0 | Status: ACTIVE | COMMUNITY

## 2022-09-12 NOTE — DISCUSSION/SUMMARY
[FreeTextEntry1] : INR levels are followed in the Coumadin clinic.\par Patient was instructed to target his T. Cholesterol to less than 200 mg/dl and LDL cholesterol to less than 70 mg/dl.\par Exercise and weight loss was advised.\par Maintain cardiac medications. \par Patient was advised to repeat a BMP, CBC , fasting lipid profile and hepatic panel.He is scheduled for 10/22\par His  2D echo doppler will be done prior to his next office visit.\par RV in 6 months.\par

## 2022-09-12 NOTE — REASON FOR VISIT
[FreeTextEntry1] : Patient presents for a follow up visit. He did not go for lab testing. He did see his Vascular surgeon Dr. Herrera for follow up of his AAA.

## 2022-09-16 ENCOUNTER — OUTPATIENT (OUTPATIENT)
Dept: OUTPATIENT SERVICES | Facility: HOSPITAL | Age: 77
LOS: 1 days | Discharge: HOME | End: 2022-09-16

## 2022-09-16 ENCOUNTER — APPOINTMENT (OUTPATIENT)
Dept: MEDICATION MANAGEMENT | Facility: CLINIC | Age: 77
End: 2022-09-16

## 2022-09-16 DIAGNOSIS — Z90.49 ACQUIRED ABSENCE OF OTHER SPECIFIED PARTS OF DIGESTIVE TRACT: Chronic | ICD-10-CM

## 2022-09-16 DIAGNOSIS — Z79.01 LONG TERM (CURRENT) USE OF ANTICOAGULANTS: ICD-10-CM

## 2022-09-16 DIAGNOSIS — I48.91 UNSPECIFIED ATRIAL FIBRILLATION: ICD-10-CM

## 2022-09-16 DIAGNOSIS — Z98.890 OTHER SPECIFIED POSTPROCEDURAL STATES: Chronic | ICD-10-CM

## 2022-09-16 LAB
INR PPP: 2.6 RATIO
POCT-PROTHROMBIN TIME: 31.5 SECS
QUALITY CONTROL: YES

## 2022-10-18 ENCOUNTER — OUTPATIENT (OUTPATIENT)
Dept: OUTPATIENT SERVICES | Facility: HOSPITAL | Age: 77
LOS: 1 days | Discharge: HOME | End: 2022-10-18

## 2022-10-18 DIAGNOSIS — R10.9 UNSPECIFIED ABDOMINAL PAIN: ICD-10-CM

## 2022-10-18 DIAGNOSIS — Z90.49 ACQUIRED ABSENCE OF OTHER SPECIFIED PARTS OF DIGESTIVE TRACT: Chronic | ICD-10-CM

## 2022-10-18 DIAGNOSIS — Z98.890 OTHER SPECIFIED POSTPROCEDURAL STATES: Chronic | ICD-10-CM

## 2022-10-18 PROCEDURE — 76705 ECHO EXAM OF ABDOMEN: CPT | Mod: 26

## 2022-10-18 PROCEDURE — 76770 US EXAM ABDO BACK WALL COMP: CPT | Mod: 26,59

## 2022-10-21 ENCOUNTER — OUTPATIENT (OUTPATIENT)
Dept: OUTPATIENT SERVICES | Facility: HOSPITAL | Age: 77
LOS: 1 days | Discharge: HOME | End: 2022-10-21

## 2022-10-21 ENCOUNTER — APPOINTMENT (OUTPATIENT)
Dept: MEDICATION MANAGEMENT | Facility: CLINIC | Age: 77
End: 2022-10-21

## 2022-10-21 DIAGNOSIS — Z90.49 ACQUIRED ABSENCE OF OTHER SPECIFIED PARTS OF DIGESTIVE TRACT: Chronic | ICD-10-CM

## 2022-10-21 DIAGNOSIS — Z98.890 OTHER SPECIFIED POSTPROCEDURAL STATES: Chronic | ICD-10-CM

## 2022-10-21 DIAGNOSIS — I48.91 UNSPECIFIED ATRIAL FIBRILLATION: ICD-10-CM

## 2022-10-21 DIAGNOSIS — Z79.01 LONG TERM (CURRENT) USE OF ANTICOAGULANTS: ICD-10-CM

## 2022-10-21 LAB
INR PPP: 2.6 RATIO
POCT-PROTHROMBIN TIME: 30.9 SECS
QUALITY CONTROL: YES

## 2022-12-02 ENCOUNTER — APPOINTMENT (OUTPATIENT)
Dept: MEDICATION MANAGEMENT | Facility: CLINIC | Age: 77
End: 2022-12-02

## 2022-12-02 ENCOUNTER — OUTPATIENT (OUTPATIENT)
Dept: OUTPATIENT SERVICES | Facility: HOSPITAL | Age: 77
LOS: 1 days | Discharge: HOME | End: 2022-12-02

## 2022-12-02 DIAGNOSIS — Z98.890 OTHER SPECIFIED POSTPROCEDURAL STATES: Chronic | ICD-10-CM

## 2022-12-02 DIAGNOSIS — I48.91 UNSPECIFIED ATRIAL FIBRILLATION: ICD-10-CM

## 2022-12-02 DIAGNOSIS — Z79.01 LONG TERM (CURRENT) USE OF ANTICOAGULANTS: ICD-10-CM

## 2022-12-02 DIAGNOSIS — Z90.49 ACQUIRED ABSENCE OF OTHER SPECIFIED PARTS OF DIGESTIVE TRACT: Chronic | ICD-10-CM

## 2022-12-02 LAB
INR PPP: 2.7 RATIO
POCT-PROTHROMBIN TIME: 31.9 SECS
QUALITY CONTROL: YES

## 2023-01-13 ENCOUNTER — OUTPATIENT (OUTPATIENT)
Dept: OUTPATIENT SERVICES | Facility: HOSPITAL | Age: 78
LOS: 1 days | Discharge: HOME | End: 2023-01-13

## 2023-01-13 ENCOUNTER — APPOINTMENT (OUTPATIENT)
Dept: MEDICATION MANAGEMENT | Facility: CLINIC | Age: 78
End: 2023-01-13

## 2023-01-13 DIAGNOSIS — I48.91 UNSPECIFIED ATRIAL FIBRILLATION: ICD-10-CM

## 2023-01-13 DIAGNOSIS — Z90.49 ACQUIRED ABSENCE OF OTHER SPECIFIED PARTS OF DIGESTIVE TRACT: Chronic | ICD-10-CM

## 2023-01-13 DIAGNOSIS — Z98.890 OTHER SPECIFIED POSTPROCEDURAL STATES: Chronic | ICD-10-CM

## 2023-01-13 DIAGNOSIS — Z79.01 LONG TERM (CURRENT) USE OF ANTICOAGULANTS: ICD-10-CM

## 2023-01-13 LAB
INR PPP: 2.3 RATIO
POCT-PROTHROMBIN TIME: 26.7 SECS
QUALITY CONTROL: YES

## 2023-02-24 ENCOUNTER — APPOINTMENT (OUTPATIENT)
Dept: MEDICATION MANAGEMENT | Facility: CLINIC | Age: 78
End: 2023-02-24

## 2023-02-24 ENCOUNTER — OUTPATIENT (OUTPATIENT)
Dept: OUTPATIENT SERVICES | Facility: HOSPITAL | Age: 78
LOS: 1 days | End: 2023-02-24
Payer: MEDICARE

## 2023-02-24 DIAGNOSIS — Z98.890 OTHER SPECIFIED POSTPROCEDURAL STATES: Chronic | ICD-10-CM

## 2023-02-24 DIAGNOSIS — Z79.01 LONG TERM (CURRENT) USE OF ANTICOAGULANTS: ICD-10-CM

## 2023-02-24 DIAGNOSIS — Z90.49 ACQUIRED ABSENCE OF OTHER SPECIFIED PARTS OF DIGESTIVE TRACT: Chronic | ICD-10-CM

## 2023-02-24 DIAGNOSIS — I48.91 UNSPECIFIED ATRIAL FIBRILLATION: ICD-10-CM

## 2023-02-24 LAB
INR PPP: 1.9 RATIO
POCT-PROTHROMBIN TIME: 22.4 SECS
QUALITY CONTROL: YES

## 2023-02-24 PROCEDURE — 99211 OFF/OP EST MAY X REQ PHY/QHP: CPT

## 2023-02-24 PROCEDURE — 85610 PROTHROMBIN TIME: CPT

## 2023-02-24 PROCEDURE — 36416 COLLJ CAPILLARY BLOOD SPEC: CPT

## 2023-02-25 DIAGNOSIS — Z79.01 LONG TERM (CURRENT) USE OF ANTICOAGULANTS: ICD-10-CM

## 2023-02-25 DIAGNOSIS — I48.91 UNSPECIFIED ATRIAL FIBRILLATION: ICD-10-CM

## 2023-03-13 ENCOUNTER — APPOINTMENT (OUTPATIENT)
Dept: CARDIOLOGY | Facility: CLINIC | Age: 78
End: 2023-03-13

## 2023-03-20 ENCOUNTER — APPOINTMENT (OUTPATIENT)
Dept: CARDIOLOGY | Facility: CLINIC | Age: 78
End: 2023-03-20

## 2023-03-24 ENCOUNTER — OUTPATIENT (OUTPATIENT)
Dept: OUTPATIENT SERVICES | Facility: HOSPITAL | Age: 78
LOS: 1 days | End: 2023-03-24
Payer: MEDICARE

## 2023-03-24 ENCOUNTER — APPOINTMENT (OUTPATIENT)
Dept: MEDICATION MANAGEMENT | Facility: CLINIC | Age: 78
End: 2023-03-24

## 2023-03-24 DIAGNOSIS — Z98.890 OTHER SPECIFIED POSTPROCEDURAL STATES: Chronic | ICD-10-CM

## 2023-03-24 DIAGNOSIS — Z90.49 ACQUIRED ABSENCE OF OTHER SPECIFIED PARTS OF DIGESTIVE TRACT: Chronic | ICD-10-CM

## 2023-03-24 DIAGNOSIS — Z79.01 LONG TERM (CURRENT) USE OF ANTICOAGULANTS: ICD-10-CM

## 2023-03-24 DIAGNOSIS — I48.91 UNSPECIFIED ATRIAL FIBRILLATION: ICD-10-CM

## 2023-03-24 LAB
INR PPP: 4 RATIO
POCT-PROTHROMBIN TIME: 48 SECS
QUALITY CONTROL: YES

## 2023-03-24 PROCEDURE — 99211 OFF/OP EST MAY X REQ PHY/QHP: CPT

## 2023-03-24 PROCEDURE — 85610 PROTHROMBIN TIME: CPT

## 2023-03-24 PROCEDURE — 36416 COLLJ CAPILLARY BLOOD SPEC: CPT

## 2023-03-25 DIAGNOSIS — Z79.01 LONG TERM (CURRENT) USE OF ANTICOAGULANTS: ICD-10-CM

## 2023-03-25 DIAGNOSIS — I48.91 UNSPECIFIED ATRIAL FIBRILLATION: ICD-10-CM

## 2023-03-28 NOTE — DISCHARGE NOTE ADULT - PATIENT PORTAL LINK FT
You can access the [facility] Patient Portal, offered by [facility], by registering with the following website: [email or web address] Counseling:  I discussed with the patient the risks of fluoroquinolones including but not limited to GI upset, allergic reaction, drug rash, diarrhea, dizziness, photosensitivity, yeast infections, liver function test abnormalities, tendonitis/tendon rupture.

## 2023-04-07 ENCOUNTER — OUTPATIENT (OUTPATIENT)
Dept: OUTPATIENT SERVICES | Facility: HOSPITAL | Age: 78
LOS: 1 days | End: 2023-04-07
Payer: MEDICARE

## 2023-04-07 ENCOUNTER — APPOINTMENT (OUTPATIENT)
Dept: MEDICATION MANAGEMENT | Facility: CLINIC | Age: 78
End: 2023-04-07

## 2023-04-07 DIAGNOSIS — I48.91 UNSPECIFIED ATRIAL FIBRILLATION: ICD-10-CM

## 2023-04-07 DIAGNOSIS — Z90.49 ACQUIRED ABSENCE OF OTHER SPECIFIED PARTS OF DIGESTIVE TRACT: Chronic | ICD-10-CM

## 2023-04-07 DIAGNOSIS — Z79.01 LONG TERM (CURRENT) USE OF ANTICOAGULANTS: ICD-10-CM

## 2023-04-07 DIAGNOSIS — Z98.890 OTHER SPECIFIED POSTPROCEDURAL STATES: Chronic | ICD-10-CM

## 2023-04-07 LAB
INR PPP: 1.9 RATIO
POCT-PROTHROMBIN TIME: 22.5 SECS
QUALITY CONTROL: YES

## 2023-04-07 PROCEDURE — 36416 COLLJ CAPILLARY BLOOD SPEC: CPT

## 2023-04-07 PROCEDURE — 99211 OFF/OP EST MAY X REQ PHY/QHP: CPT

## 2023-04-07 PROCEDURE — 85610 PROTHROMBIN TIME: CPT

## 2023-04-08 DIAGNOSIS — Z79.01 LONG TERM (CURRENT) USE OF ANTICOAGULANTS: ICD-10-CM

## 2023-04-08 DIAGNOSIS — I48.91 UNSPECIFIED ATRIAL FIBRILLATION: ICD-10-CM

## 2023-05-05 ENCOUNTER — OUTPATIENT (OUTPATIENT)
Dept: OUTPATIENT SERVICES | Facility: HOSPITAL | Age: 78
LOS: 1 days | End: 2023-05-05
Payer: MEDICARE

## 2023-05-05 ENCOUNTER — APPOINTMENT (OUTPATIENT)
Dept: MEDICATION MANAGEMENT | Facility: CLINIC | Age: 78
End: 2023-05-05

## 2023-05-05 DIAGNOSIS — Z90.49 ACQUIRED ABSENCE OF OTHER SPECIFIED PARTS OF DIGESTIVE TRACT: Chronic | ICD-10-CM

## 2023-05-05 DIAGNOSIS — I48.91 UNSPECIFIED ATRIAL FIBRILLATION: ICD-10-CM

## 2023-05-05 DIAGNOSIS — Z79.01 LONG TERM (CURRENT) USE OF ANTICOAGULANTS: ICD-10-CM

## 2023-05-05 DIAGNOSIS — Z98.890 OTHER SPECIFIED POSTPROCEDURAL STATES: Chronic | ICD-10-CM

## 2023-05-05 LAB
INR PPP: 1.9 RATIO
POCT-PROTHROMBIN TIME: 22.2 SECS
QUALITY CONTROL: YES

## 2023-05-05 PROCEDURE — 36416 COLLJ CAPILLARY BLOOD SPEC: CPT

## 2023-05-05 PROCEDURE — 99211 OFF/OP EST MAY X REQ PHY/QHP: CPT

## 2023-05-05 PROCEDURE — 85610 PROTHROMBIN TIME: CPT

## 2023-05-06 DIAGNOSIS — I48.91 UNSPECIFIED ATRIAL FIBRILLATION: ICD-10-CM

## 2023-05-06 DIAGNOSIS — Z79.01 LONG TERM (CURRENT) USE OF ANTICOAGULANTS: ICD-10-CM

## 2023-05-30 ENCOUNTER — APPOINTMENT (OUTPATIENT)
Dept: CARDIOLOGY | Facility: CLINIC | Age: 78
End: 2023-05-30
Payer: MEDICARE

## 2023-05-30 PROCEDURE — 93306 TTE W/DOPPLER COMPLETE: CPT

## 2023-06-02 ENCOUNTER — OUTPATIENT (OUTPATIENT)
Dept: OUTPATIENT SERVICES | Facility: HOSPITAL | Age: 78
LOS: 1 days | End: 2023-06-02
Payer: MEDICARE

## 2023-06-02 ENCOUNTER — APPOINTMENT (OUTPATIENT)
Dept: MEDICATION MANAGEMENT | Facility: CLINIC | Age: 78
End: 2023-06-02

## 2023-06-02 DIAGNOSIS — I48.91 UNSPECIFIED ATRIAL FIBRILLATION: ICD-10-CM

## 2023-06-02 DIAGNOSIS — Z98.890 OTHER SPECIFIED POSTPROCEDURAL STATES: Chronic | ICD-10-CM

## 2023-06-02 DIAGNOSIS — Z90.49 ACQUIRED ABSENCE OF OTHER SPECIFIED PARTS OF DIGESTIVE TRACT: Chronic | ICD-10-CM

## 2023-06-02 DIAGNOSIS — Z79.01 LONG TERM (CURRENT) USE OF ANTICOAGULANTS: ICD-10-CM

## 2023-06-02 LAB
INR PPP: 2.2 RATIO
POCT-PROTHROMBIN TIME: 26.1 SECS
QUALITY CONTROL: YES

## 2023-06-02 PROCEDURE — 36416 COLLJ CAPILLARY BLOOD SPEC: CPT

## 2023-06-02 PROCEDURE — 99211 OFF/OP EST MAY X REQ PHY/QHP: CPT

## 2023-06-02 PROCEDURE — 85610 PROTHROMBIN TIME: CPT

## 2023-06-03 DIAGNOSIS — I48.91 UNSPECIFIED ATRIAL FIBRILLATION: ICD-10-CM

## 2023-06-03 DIAGNOSIS — Z79.01 LONG TERM (CURRENT) USE OF ANTICOAGULANTS: ICD-10-CM

## 2023-06-16 ENCOUNTER — APPOINTMENT (OUTPATIENT)
Dept: CARDIOLOGY | Facility: CLINIC | Age: 78
End: 2023-06-16
Payer: MEDICARE

## 2023-06-16 VITALS
BODY MASS INDEX: 30.37 KG/M2 | HEIGHT: 66 IN | DIASTOLIC BLOOD PRESSURE: 87 MMHG | WEIGHT: 189 LBS | SYSTOLIC BLOOD PRESSURE: 125 MMHG

## 2023-06-16 PROCEDURE — 99214 OFFICE O/P EST MOD 30 MIN: CPT | Mod: 25

## 2023-06-16 PROCEDURE — 93000 ELECTROCARDIOGRAM COMPLETE: CPT

## 2023-06-16 NOTE — DISCUSSION/SUMMARY
[FreeTextEntry1] : INR levels are followed in the Coumadin clinic.\par Patient was instructed to target his T. Cholesterol to less than 200 mg/dl and LDL cholesterol to less than 70 mg/dl.\par Exercise and weight loss was advised.\par Maintain cardiac medications. \par EKG: NSR, rate of 74 anterior wall MI/Ventricular aneurysm\par Patient had a lab test with his PCP, results are pending.\par His  2D echo doppler results were reviewed with him and the results were provided to him. Findings are similar to his prior results.\par RV in 6 months.\par

## 2023-07-07 ENCOUNTER — OUTPATIENT (OUTPATIENT)
Dept: OUTPATIENT SERVICES | Facility: HOSPITAL | Age: 78
LOS: 1 days | End: 2023-07-07
Payer: MEDICARE

## 2023-07-07 ENCOUNTER — APPOINTMENT (OUTPATIENT)
Dept: MEDICATION MANAGEMENT | Facility: CLINIC | Age: 78
End: 2023-07-07

## 2023-07-07 DIAGNOSIS — Z98.890 OTHER SPECIFIED POSTPROCEDURAL STATES: Chronic | ICD-10-CM

## 2023-07-07 DIAGNOSIS — Z79.01 LONG TERM (CURRENT) USE OF ANTICOAGULANTS: ICD-10-CM

## 2023-07-07 DIAGNOSIS — I48.91 UNSPECIFIED ATRIAL FIBRILLATION: ICD-10-CM

## 2023-07-07 DIAGNOSIS — Z90.49 ACQUIRED ABSENCE OF OTHER SPECIFIED PARTS OF DIGESTIVE TRACT: Chronic | ICD-10-CM

## 2023-07-07 LAB
INR PPP: 2 RATIO
POCT-PROTHROMBIN TIME: 24.1 SECS
QUALITY CONTROL: YES

## 2023-07-07 PROCEDURE — 85610 PROTHROMBIN TIME: CPT

## 2023-07-07 PROCEDURE — 36416 COLLJ CAPILLARY BLOOD SPEC: CPT

## 2023-07-07 PROCEDURE — 99211 OFF/OP EST MAY X REQ PHY/QHP: CPT

## 2023-07-08 DIAGNOSIS — Z79.01 LONG TERM (CURRENT) USE OF ANTICOAGULANTS: ICD-10-CM

## 2023-07-08 DIAGNOSIS — I48.91 UNSPECIFIED ATRIAL FIBRILLATION: ICD-10-CM

## 2023-08-11 ENCOUNTER — OUTPATIENT (OUTPATIENT)
Dept: OUTPATIENT SERVICES | Facility: HOSPITAL | Age: 78
LOS: 1 days | End: 2023-08-11
Payer: MEDICARE

## 2023-08-11 ENCOUNTER — APPOINTMENT (OUTPATIENT)
Dept: MEDICATION MANAGEMENT | Facility: CLINIC | Age: 78
End: 2023-08-11

## 2023-08-11 DIAGNOSIS — Z98.890 OTHER SPECIFIED POSTPROCEDURAL STATES: Chronic | ICD-10-CM

## 2023-08-11 DIAGNOSIS — Z90.49 ACQUIRED ABSENCE OF OTHER SPECIFIED PARTS OF DIGESTIVE TRACT: Chronic | ICD-10-CM

## 2023-08-11 DIAGNOSIS — Z79.01 LONG TERM (CURRENT) USE OF ANTICOAGULANTS: ICD-10-CM

## 2023-08-11 DIAGNOSIS — I48.91 UNSPECIFIED ATRIAL FIBRILLATION: ICD-10-CM

## 2023-08-11 LAB
INR PPP: 2 RATIO
POCT-PROTHROMBIN TIME: 23.8 SECS
QUALITY CONTROL: YES

## 2023-08-11 PROCEDURE — 85610 PROTHROMBIN TIME: CPT

## 2023-08-11 PROCEDURE — 36416 COLLJ CAPILLARY BLOOD SPEC: CPT

## 2023-08-11 PROCEDURE — 99211 OFF/OP EST MAY X REQ PHY/QHP: CPT

## 2023-08-12 DIAGNOSIS — I48.91 UNSPECIFIED ATRIAL FIBRILLATION: ICD-10-CM

## 2023-08-12 DIAGNOSIS — Z79.01 LONG TERM (CURRENT) USE OF ANTICOAGULANTS: ICD-10-CM

## 2023-09-15 ENCOUNTER — APPOINTMENT (OUTPATIENT)
Dept: MEDICATION MANAGEMENT | Facility: CLINIC | Age: 78
End: 2023-09-15

## 2023-09-15 ENCOUNTER — OUTPATIENT (OUTPATIENT)
Dept: OUTPATIENT SERVICES | Facility: HOSPITAL | Age: 78
LOS: 1 days | End: 2023-09-15
Payer: MEDICARE

## 2023-09-15 DIAGNOSIS — Z98.890 OTHER SPECIFIED POSTPROCEDURAL STATES: Chronic | ICD-10-CM

## 2023-09-15 DIAGNOSIS — Z79.01 LONG TERM (CURRENT) USE OF ANTICOAGULANTS: ICD-10-CM

## 2023-09-15 DIAGNOSIS — I48.91 UNSPECIFIED ATRIAL FIBRILLATION: ICD-10-CM

## 2023-09-15 DIAGNOSIS — Z90.49 ACQUIRED ABSENCE OF OTHER SPECIFIED PARTS OF DIGESTIVE TRACT: Chronic | ICD-10-CM

## 2023-09-15 LAB
INR PPP: 1.7 RATIO
POCT-PROTHROMBIN TIME: 20.1 SECS
QUALITY CONTROL: YES

## 2023-09-15 PROCEDURE — 99211 OFF/OP EST MAY X REQ PHY/QHP: CPT

## 2023-09-15 PROCEDURE — 85610 PROTHROMBIN TIME: CPT

## 2023-09-15 PROCEDURE — 36416 COLLJ CAPILLARY BLOOD SPEC: CPT

## 2023-09-15 RX ORDER — WARFARIN 5 MG/1
5 TABLET ORAL DAILY
Qty: 90 | Refills: 3 | Status: ACTIVE | COMMUNITY
Start: 2021-03-12 | End: 1900-01-01

## 2023-09-16 DIAGNOSIS — Z79.01 LONG TERM (CURRENT) USE OF ANTICOAGULANTS: ICD-10-CM

## 2023-09-16 DIAGNOSIS — I48.91 UNSPECIFIED ATRIAL FIBRILLATION: ICD-10-CM

## 2023-09-29 ENCOUNTER — APPOINTMENT (OUTPATIENT)
Dept: MEDICATION MANAGEMENT | Facility: CLINIC | Age: 78
End: 2023-09-29

## 2023-09-29 ENCOUNTER — OUTPATIENT (OUTPATIENT)
Dept: OUTPATIENT SERVICES | Facility: HOSPITAL | Age: 78
LOS: 1 days | End: 2023-09-29
Payer: MEDICARE

## 2023-09-29 DIAGNOSIS — Z98.890 OTHER SPECIFIED POSTPROCEDURAL STATES: Chronic | ICD-10-CM

## 2023-09-29 DIAGNOSIS — I48.91 UNSPECIFIED ATRIAL FIBRILLATION: ICD-10-CM

## 2023-09-29 DIAGNOSIS — Z79.01 LONG TERM (CURRENT) USE OF ANTICOAGULANTS: ICD-10-CM

## 2023-09-29 LAB
INR PPP: 2.2 RATIO
POCT-PROTHROMBIN TIME: 26.5 SECS
QUALITY CONTROL: YES

## 2023-09-29 PROCEDURE — 99211 OFF/OP EST MAY X REQ PHY/QHP: CPT

## 2023-09-29 PROCEDURE — 36416 COLLJ CAPILLARY BLOOD SPEC: CPT

## 2023-09-29 PROCEDURE — 85610 PROTHROMBIN TIME: CPT

## 2023-09-30 DIAGNOSIS — I48.91 UNSPECIFIED ATRIAL FIBRILLATION: ICD-10-CM

## 2023-09-30 DIAGNOSIS — Z79.01 LONG TERM (CURRENT) USE OF ANTICOAGULANTS: ICD-10-CM

## 2023-10-27 ENCOUNTER — OUTPATIENT (OUTPATIENT)
Dept: OUTPATIENT SERVICES | Facility: HOSPITAL | Age: 78
LOS: 1 days | End: 2023-10-27
Payer: MEDICARE

## 2023-10-27 ENCOUNTER — APPOINTMENT (OUTPATIENT)
Dept: MEDICATION MANAGEMENT | Facility: CLINIC | Age: 78
End: 2023-10-27

## 2023-10-27 DIAGNOSIS — Z79.01 LONG TERM (CURRENT) USE OF ANTICOAGULANTS: ICD-10-CM

## 2023-10-27 DIAGNOSIS — I48.91 UNSPECIFIED ATRIAL FIBRILLATION: ICD-10-CM

## 2023-10-27 DIAGNOSIS — Z98.890 OTHER SPECIFIED POSTPROCEDURAL STATES: Chronic | ICD-10-CM

## 2023-10-27 DIAGNOSIS — Z90.49 ACQUIRED ABSENCE OF OTHER SPECIFIED PARTS OF DIGESTIVE TRACT: Chronic | ICD-10-CM

## 2023-10-27 LAB
INR PPP: 2.5 RATIO
POCT-PROTHROMBIN TIME: 29.8 SECS
QUALITY CONTROL: YES

## 2023-10-27 PROCEDURE — 36416 COLLJ CAPILLARY BLOOD SPEC: CPT

## 2023-10-27 PROCEDURE — 85610 PROTHROMBIN TIME: CPT

## 2023-10-27 PROCEDURE — 99211 OFF/OP EST MAY X REQ PHY/QHP: CPT

## 2023-10-28 DIAGNOSIS — Z79.01 LONG TERM (CURRENT) USE OF ANTICOAGULANTS: ICD-10-CM

## 2023-10-28 DIAGNOSIS — I48.91 UNSPECIFIED ATRIAL FIBRILLATION: ICD-10-CM

## 2023-12-01 ENCOUNTER — OUTPATIENT (OUTPATIENT)
Dept: OUTPATIENT SERVICES | Facility: HOSPITAL | Age: 78
LOS: 1 days | End: 2023-12-01
Payer: MEDICARE

## 2023-12-01 ENCOUNTER — APPOINTMENT (OUTPATIENT)
Dept: MEDICATION MANAGEMENT | Facility: CLINIC | Age: 78
End: 2023-12-01

## 2023-12-01 DIAGNOSIS — I48.91 UNSPECIFIED ATRIAL FIBRILLATION: ICD-10-CM

## 2023-12-01 DIAGNOSIS — Z79.01 LONG TERM (CURRENT) USE OF ANTICOAGULANTS: ICD-10-CM

## 2023-12-01 DIAGNOSIS — Z90.49 ACQUIRED ABSENCE OF OTHER SPECIFIED PARTS OF DIGESTIVE TRACT: Chronic | ICD-10-CM

## 2023-12-01 LAB
INR PPP: 2.2 RATIO
POCT-PROTHROMBIN TIME: 26 SECS
QUALITY CONTROL: YES

## 2023-12-01 PROCEDURE — 85610 PROTHROMBIN TIME: CPT

## 2023-12-01 PROCEDURE — 36416 COLLJ CAPILLARY BLOOD SPEC: CPT

## 2023-12-01 PROCEDURE — 99211 OFF/OP EST MAY X REQ PHY/QHP: CPT

## 2023-12-02 DIAGNOSIS — Z79.01 LONG TERM (CURRENT) USE OF ANTICOAGULANTS: ICD-10-CM

## 2023-12-02 DIAGNOSIS — I48.91 UNSPECIFIED ATRIAL FIBRILLATION: ICD-10-CM

## 2023-12-15 ENCOUNTER — APPOINTMENT (OUTPATIENT)
Dept: CARDIOLOGY | Facility: CLINIC | Age: 78
End: 2023-12-15
Payer: MEDICARE

## 2023-12-15 VITALS
SYSTOLIC BLOOD PRESSURE: 128 MMHG | HEIGHT: 66 IN | WEIGHT: 193 LBS | DIASTOLIC BLOOD PRESSURE: 70 MMHG | HEART RATE: 77 BPM | BODY MASS INDEX: 31.02 KG/M2

## 2023-12-15 PROCEDURE — 99214 OFFICE O/P EST MOD 30 MIN: CPT | Mod: 25

## 2023-12-15 PROCEDURE — 93000 ELECTROCARDIOGRAM COMPLETE: CPT

## 2023-12-15 NOTE — REASON FOR VISIT
[FreeTextEntry1] : Patient presents for a follow up Cardiology visit. He is here to review his lab test results. He denies any cardiac complaints.

## 2023-12-15 NOTE — ASSESSMENT
[FreeTextEntry1] : PVD  Cigarette smoker  AAA  Moderate LV systolic dysfunction  LV thrombus with LV aneurysm  Hyperlipidemia  PVD.

## 2023-12-15 NOTE — DISCUSSION/SUMMARY
[FreeTextEntry1] : INR levels are followed in the Coumadin clinic. Patient was instructed to target his T. Cholesterol to less than 200 mg/dl and LDL cholesterol to less than 70 mg/dl. He is at target goal with respect to his lipid levels. Exercise and weight loss was advised. Maintain cardiac medications.  EKG: NSR, rate of 74 anterior wall MI/Ventricular aneurysm Patient had a lab test with his PCP, results were reviewed with him and his spouse. His 2D echo doppler results were reviewed with him and the results were provided to him on his prior visit.. Findings are similar to his prior results. Cigarette smoking cessation is advised. RV in 6 months.  [EKG obtained to assist in diagnosis and management of assessed problem(s)] : EKG obtained to assist in diagnosis and management of assessed problem(s)

## 2024-01-05 ENCOUNTER — OUTPATIENT (OUTPATIENT)
Dept: OUTPATIENT SERVICES | Facility: HOSPITAL | Age: 79
LOS: 1 days | End: 2024-01-05
Payer: MEDICARE

## 2024-01-05 ENCOUNTER — APPOINTMENT (OUTPATIENT)
Dept: MEDICATION MANAGEMENT | Facility: CLINIC | Age: 79
End: 2024-01-05

## 2024-01-05 DIAGNOSIS — Z90.49 ACQUIRED ABSENCE OF OTHER SPECIFIED PARTS OF DIGESTIVE TRACT: Chronic | ICD-10-CM

## 2024-01-05 DIAGNOSIS — Z98.890 OTHER SPECIFIED POSTPROCEDURAL STATES: Chronic | ICD-10-CM

## 2024-01-05 DIAGNOSIS — Z79.01 LONG TERM (CURRENT) USE OF ANTICOAGULANTS: ICD-10-CM

## 2024-01-05 DIAGNOSIS — I48.91 UNSPECIFIED ATRIAL FIBRILLATION: ICD-10-CM

## 2024-01-05 LAB
INR PPP: 3 RATIO
POCT-PROTHROMBIN TIME: 35.8 SECS
QUALITY CONTROL: YES

## 2024-01-05 PROCEDURE — 36416 COLLJ CAPILLARY BLOOD SPEC: CPT

## 2024-01-05 PROCEDURE — 85610 PROTHROMBIN TIME: CPT

## 2024-01-05 PROCEDURE — 99211 OFF/OP EST MAY X REQ PHY/QHP: CPT

## 2024-01-06 DIAGNOSIS — Z79.01 LONG TERM (CURRENT) USE OF ANTICOAGULANTS: ICD-10-CM

## 2024-01-06 DIAGNOSIS — I48.91 UNSPECIFIED ATRIAL FIBRILLATION: ICD-10-CM

## 2024-02-09 ENCOUNTER — OUTPATIENT (OUTPATIENT)
Dept: OUTPATIENT SERVICES | Facility: HOSPITAL | Age: 79
LOS: 1 days | End: 2024-02-09
Payer: MEDICARE

## 2024-02-09 ENCOUNTER — APPOINTMENT (OUTPATIENT)
Dept: MEDICATION MANAGEMENT | Facility: CLINIC | Age: 79
End: 2024-02-09

## 2024-02-09 DIAGNOSIS — I48.91 UNSPECIFIED ATRIAL FIBRILLATION: ICD-10-CM

## 2024-02-09 DIAGNOSIS — Z98.890 OTHER SPECIFIED POSTPROCEDURAL STATES: Chronic | ICD-10-CM

## 2024-02-09 DIAGNOSIS — Z79.01 LONG TERM (CURRENT) USE OF ANTICOAGULANTS: ICD-10-CM

## 2024-02-09 DIAGNOSIS — Z90.49 ACQUIRED ABSENCE OF OTHER SPECIFIED PARTS OF DIGESTIVE TRACT: Chronic | ICD-10-CM

## 2024-02-09 LAB
INR PPP: 2.8 RATIO
POCT-PROTHROMBIN TIME: 33 SECS
QUALITY CONTROL: YES

## 2024-02-09 PROCEDURE — 36416 COLLJ CAPILLARY BLOOD SPEC: CPT

## 2024-02-09 PROCEDURE — 85610 PROTHROMBIN TIME: CPT

## 2024-02-09 PROCEDURE — 99211 OFF/OP EST MAY X REQ PHY/QHP: CPT

## 2024-02-10 DIAGNOSIS — Z79.01 LONG TERM (CURRENT) USE OF ANTICOAGULANTS: ICD-10-CM

## 2024-02-10 DIAGNOSIS — I48.91 UNSPECIFIED ATRIAL FIBRILLATION: ICD-10-CM

## 2024-03-15 ENCOUNTER — APPOINTMENT (OUTPATIENT)
Dept: MEDICATION MANAGEMENT | Facility: CLINIC | Age: 79
End: 2024-03-15

## 2024-03-15 ENCOUNTER — OUTPATIENT (OUTPATIENT)
Dept: OUTPATIENT SERVICES | Facility: HOSPITAL | Age: 79
LOS: 1 days | End: 2024-03-15
Payer: MEDICARE

## 2024-03-15 DIAGNOSIS — Z98.890 OTHER SPECIFIED POSTPROCEDURAL STATES: Chronic | ICD-10-CM

## 2024-03-15 DIAGNOSIS — Z90.49 ACQUIRED ABSENCE OF OTHER SPECIFIED PARTS OF DIGESTIVE TRACT: Chronic | ICD-10-CM

## 2024-03-15 DIAGNOSIS — Z79.01 LONG TERM (CURRENT) USE OF ANTICOAGULANTS: ICD-10-CM

## 2024-03-15 DIAGNOSIS — I48.91 UNSPECIFIED ATRIAL FIBRILLATION: ICD-10-CM

## 2024-03-15 PROCEDURE — 85610 PROTHROMBIN TIME: CPT

## 2024-03-15 PROCEDURE — 99211 OFF/OP EST MAY X REQ PHY/QHP: CPT

## 2024-03-15 PROCEDURE — 36416 COLLJ CAPILLARY BLOOD SPEC: CPT

## 2024-03-22 ENCOUNTER — APPOINTMENT (OUTPATIENT)
Dept: MEDICATION MANAGEMENT | Facility: CLINIC | Age: 79
End: 2024-03-22

## 2024-04-12 ENCOUNTER — OUTPATIENT (OUTPATIENT)
Dept: OUTPATIENT SERVICES | Facility: HOSPITAL | Age: 79
LOS: 1 days | End: 2024-04-12
Payer: MEDICARE

## 2024-04-12 ENCOUNTER — APPOINTMENT (OUTPATIENT)
Dept: MEDICATION MANAGEMENT | Facility: CLINIC | Age: 79
End: 2024-04-12

## 2024-04-12 DIAGNOSIS — I48.91 UNSPECIFIED ATRIAL FIBRILLATION: ICD-10-CM

## 2024-04-12 DIAGNOSIS — Z90.49 ACQUIRED ABSENCE OF OTHER SPECIFIED PARTS OF DIGESTIVE TRACT: Chronic | ICD-10-CM

## 2024-04-12 DIAGNOSIS — Z79.01 LONG TERM (CURRENT) USE OF ANTICOAGULANTS: ICD-10-CM

## 2024-04-12 DIAGNOSIS — Z98.890 OTHER SPECIFIED POSTPROCEDURAL STATES: Chronic | ICD-10-CM

## 2024-04-12 PROCEDURE — 85610 PROTHROMBIN TIME: CPT

## 2024-04-12 PROCEDURE — 99211 OFF/OP EST MAY X REQ PHY/QHP: CPT

## 2024-04-12 PROCEDURE — 36416 COLLJ CAPILLARY BLOOD SPEC: CPT

## 2024-05-17 ENCOUNTER — OUTPATIENT (OUTPATIENT)
Dept: OUTPATIENT SERVICES | Facility: HOSPITAL | Age: 79
LOS: 1 days | End: 2024-05-17
Payer: MEDICARE

## 2024-05-17 ENCOUNTER — APPOINTMENT (OUTPATIENT)
Dept: MEDICATION MANAGEMENT | Facility: CLINIC | Age: 79
End: 2024-05-17

## 2024-05-17 DIAGNOSIS — I48.91 UNSPECIFIED ATRIAL FIBRILLATION: ICD-10-CM

## 2024-05-17 DIAGNOSIS — Z90.49 ACQUIRED ABSENCE OF OTHER SPECIFIED PARTS OF DIGESTIVE TRACT: Chronic | ICD-10-CM

## 2024-05-17 DIAGNOSIS — Z79.01 LONG TERM (CURRENT) USE OF ANTICOAGULANTS: ICD-10-CM

## 2024-05-17 DIAGNOSIS — Z98.890 OTHER SPECIFIED POSTPROCEDURAL STATES: Chronic | ICD-10-CM

## 2024-05-17 LAB
INR PPP: 2.6 RATIO
POCT-PROTHROMBIN TIME: 31.3 SECS
QUALITY CONTROL: YES

## 2024-05-17 PROCEDURE — 36416 COLLJ CAPILLARY BLOOD SPEC: CPT

## 2024-05-17 PROCEDURE — 99211 OFF/OP EST MAY X REQ PHY/QHP: CPT

## 2024-05-17 PROCEDURE — 85610 PROTHROMBIN TIME: CPT

## 2024-06-20 ENCOUNTER — APPOINTMENT (OUTPATIENT)
Dept: CARDIOLOGY | Facility: CLINIC | Age: 79
End: 2024-06-20
Payer: MEDICARE

## 2024-06-20 VITALS
SYSTOLIC BLOOD PRESSURE: 110 MMHG | DIASTOLIC BLOOD PRESSURE: 70 MMHG | BODY MASS INDEX: 29.89 KG/M2 | HEIGHT: 66 IN | WEIGHT: 186 LBS | HEART RATE: 68 BPM

## 2024-06-20 DIAGNOSIS — I34.0 NONRHEUMATIC MITRAL (VALVE) INSUFFICIENCY: ICD-10-CM

## 2024-06-20 DIAGNOSIS — Z79.01 ENCOUNTER FOR THERAPEUTIC DRUG LVL MONITORING: ICD-10-CM

## 2024-06-20 DIAGNOSIS — Z79.01 LONG TERM (CURRENT) USE OF ANTICOAGULANTS: ICD-10-CM

## 2024-06-20 DIAGNOSIS — E78.5 HYPERLIPIDEMIA, UNSPECIFIED: ICD-10-CM

## 2024-06-20 DIAGNOSIS — I51.9 HEART DISEASE, UNSPECIFIED: ICD-10-CM

## 2024-06-20 DIAGNOSIS — I48.91 UNSPECIFIED ATRIAL FIBRILLATION: ICD-10-CM

## 2024-06-20 DIAGNOSIS — I23.6 THROMBOSIS OF ATRIUM, AURICULAR APPENDAGE, AND VENTRICLE AS CURRENT COMPLICATIONS FOLLOWING ACUTE MYOCARDIAL INFARCTION: ICD-10-CM

## 2024-06-20 DIAGNOSIS — Z51.81 ENCOUNTER FOR THERAPEUTIC DRUG LVL MONITORING: ICD-10-CM

## 2024-06-20 DIAGNOSIS — I70.90 UNSPECIFIED ATHEROSCLEROSIS: ICD-10-CM

## 2024-06-20 PROCEDURE — 93000 ELECTROCARDIOGRAM COMPLETE: CPT

## 2024-06-20 PROCEDURE — 99214 OFFICE O/P EST MOD 30 MIN: CPT | Mod: 25

## 2024-06-20 NOTE — REASON FOR VISIT
[FreeTextEntry1] : Patient presents for a follow up Cardiology visit. He is here to review his lab test results. He denies any cardiac complaints. He is vaping in place of cigarette smoking.

## 2024-06-20 NOTE — DISCUSSION/SUMMARY
[EKG obtained to assist in diagnosis and management of assessed problem(s)] : EKG obtained to assist in diagnosis and management of assessed problem(s) [FreeTextEntry1] : INR levels are followed in the Coumadin clinic. Patient was instructed to target his T. Cholesterol to less than 200 mg/dl and LDL cholesterol to less than 70 mg/dl. He is at target goal with respect to his lipid levels. Exercise and weight loss was advised. Maintain cardiac medications.  EKG: NSR, rate of 68 bpm anterior wall MI/Ventricular aneurysm, APC's Patient had a lab test with his PCP, results were reviewed with him and his spouse. His 2D echo doppler results were reviewed with him and the results were provided to him on his prior visit.. Findings are similar to his prior results. Cigarette smoking cessation is advised. RV in 6 months.

## 2024-06-20 NOTE — ASSESSMENT
[FreeTextEntry1] : PVD  Cigarette smoker now vaping  AAA  Moderate LV systolic dysfunction  LV thrombus with LV aneurysm  Hyperlipidemia  PVD.

## 2024-06-21 ENCOUNTER — OUTPATIENT (OUTPATIENT)
Dept: OUTPATIENT SERVICES | Facility: HOSPITAL | Age: 79
LOS: 1 days | End: 2024-06-21
Payer: MEDICARE

## 2024-06-21 ENCOUNTER — APPOINTMENT (OUTPATIENT)
Dept: MEDICATION MANAGEMENT | Facility: CLINIC | Age: 79
End: 2024-06-21

## 2024-06-21 DIAGNOSIS — Z79.01 LONG TERM (CURRENT) USE OF ANTICOAGULANTS: ICD-10-CM

## 2024-06-21 DIAGNOSIS — I48.91 UNSPECIFIED ATRIAL FIBRILLATION: ICD-10-CM

## 2024-06-21 DIAGNOSIS — Z90.49 ACQUIRED ABSENCE OF OTHER SPECIFIED PARTS OF DIGESTIVE TRACT: Chronic | ICD-10-CM

## 2024-06-21 LAB
INR PPP: 2.1 RATIO
POCT-PROTHROMBIN TIME: 25.1 SECS
QUALITY CONTROL: YES

## 2024-06-21 PROCEDURE — 85610 PROTHROMBIN TIME: CPT

## 2024-06-21 PROCEDURE — 36416 COLLJ CAPILLARY BLOOD SPEC: CPT

## 2024-06-21 PROCEDURE — 99211 OFF/OP EST MAY X REQ PHY/QHP: CPT

## 2024-07-26 ENCOUNTER — APPOINTMENT (OUTPATIENT)
Dept: MEDICATION MANAGEMENT | Facility: CLINIC | Age: 79
End: 2024-07-26

## 2024-07-26 LAB
INR PPP: 3.2 RATIO
POCT-PROTHROMBIN TIME: 38 SECS
QUALITY CONTROL: YES

## 2024-08-23 ENCOUNTER — APPOINTMENT (OUTPATIENT)
Dept: MEDICATION MANAGEMENT | Facility: CLINIC | Age: 79
End: 2024-08-23

## 2024-08-23 ENCOUNTER — OUTPATIENT (OUTPATIENT)
Dept: OUTPATIENT SERVICES | Facility: HOSPITAL | Age: 79
LOS: 1 days | End: 2024-08-23
Payer: MEDICARE

## 2024-08-23 DIAGNOSIS — Z79.01 LONG TERM (CURRENT) USE OF ANTICOAGULANTS: ICD-10-CM

## 2024-08-23 DIAGNOSIS — Z90.49 ACQUIRED ABSENCE OF OTHER SPECIFIED PARTS OF DIGESTIVE TRACT: Chronic | ICD-10-CM

## 2024-08-23 DIAGNOSIS — I48.91 UNSPECIFIED ATRIAL FIBRILLATION: ICD-10-CM

## 2024-08-23 LAB
INR PPP: 2.9 RATIO
POCT-PROTHROMBIN TIME: 35.3 SECS
QUALITY CONTROL: YES

## 2024-08-23 PROCEDURE — 99211 OFF/OP EST MAY X REQ PHY/QHP: CPT

## 2024-08-23 PROCEDURE — 36416 COLLJ CAPILLARY BLOOD SPEC: CPT

## 2024-08-23 PROCEDURE — 85610 PROTHROMBIN TIME: CPT

## 2024-09-27 ENCOUNTER — OUTPATIENT (OUTPATIENT)
Dept: OUTPATIENT SERVICES | Facility: HOSPITAL | Age: 79
LOS: 1 days | End: 2024-09-27
Payer: MEDICARE

## 2024-09-27 ENCOUNTER — APPOINTMENT (OUTPATIENT)
Dept: MEDICATION MANAGEMENT | Facility: CLINIC | Age: 79
End: 2024-09-27

## 2024-09-27 DIAGNOSIS — I48.91 UNSPECIFIED ATRIAL FIBRILLATION: ICD-10-CM

## 2024-09-27 DIAGNOSIS — Z79.01 LONG TERM (CURRENT) USE OF ANTICOAGULANTS: ICD-10-CM

## 2024-09-27 DIAGNOSIS — Z98.890 OTHER SPECIFIED POSTPROCEDURAL STATES: Chronic | ICD-10-CM

## 2024-09-27 DIAGNOSIS — Z90.49 ACQUIRED ABSENCE OF OTHER SPECIFIED PARTS OF DIGESTIVE TRACT: Chronic | ICD-10-CM

## 2024-09-27 LAB
INR PPP: 3.5 RATIO
POCT-PROTHROMBIN TIME: 42 SECS
QUALITY CONTROL: YES

## 2024-09-27 PROCEDURE — 36416 COLLJ CAPILLARY BLOOD SPEC: CPT

## 2024-09-27 PROCEDURE — 85610 PROTHROMBIN TIME: CPT

## 2024-09-27 PROCEDURE — 99211 OFF/OP EST MAY X REQ PHY/QHP: CPT

## 2024-10-18 ENCOUNTER — APPOINTMENT (OUTPATIENT)
Dept: MEDICATION MANAGEMENT | Facility: CLINIC | Age: 79
End: 2024-10-18

## 2024-10-18 ENCOUNTER — OUTPATIENT (OUTPATIENT)
Dept: OUTPATIENT SERVICES | Facility: HOSPITAL | Age: 79
LOS: 1 days | End: 2024-10-18
Payer: MEDICARE

## 2024-10-18 DIAGNOSIS — I48.91 UNSPECIFIED ATRIAL FIBRILLATION: ICD-10-CM

## 2024-10-18 DIAGNOSIS — Z79.01 LONG TERM (CURRENT) USE OF ANTICOAGULANTS: ICD-10-CM

## 2024-10-18 DIAGNOSIS — Z98.890 OTHER SPECIFIED POSTPROCEDURAL STATES: Chronic | ICD-10-CM

## 2024-10-18 LAB
INR PPP: 2.4 RATIO
POCT-PROTHROMBIN TIME: 28.6 SECS
QUALITY CONTROL: YES

## 2024-10-18 PROCEDURE — 36416 COLLJ CAPILLARY BLOOD SPEC: CPT

## 2024-10-18 PROCEDURE — 99211 OFF/OP EST MAY X REQ PHY/QHP: CPT

## 2024-10-18 PROCEDURE — 85610 PROTHROMBIN TIME: CPT

## 2024-11-12 ENCOUNTER — RX RENEWAL (OUTPATIENT)
Age: 79
End: 2024-11-12

## 2024-11-15 ENCOUNTER — OUTPATIENT (OUTPATIENT)
Dept: OUTPATIENT SERVICES | Facility: HOSPITAL | Age: 79
LOS: 1 days | End: 2024-11-15
Payer: MEDICAID

## 2024-11-15 ENCOUNTER — APPOINTMENT (OUTPATIENT)
Dept: MEDICATION MANAGEMENT | Facility: CLINIC | Age: 79
End: 2024-11-15

## 2024-11-15 DIAGNOSIS — Z79.01 LONG TERM (CURRENT) USE OF ANTICOAGULANTS: ICD-10-CM

## 2024-11-15 DIAGNOSIS — I48.91 UNSPECIFIED ATRIAL FIBRILLATION: ICD-10-CM

## 2024-11-15 DIAGNOSIS — Z98.890 OTHER SPECIFIED POSTPROCEDURAL STATES: Chronic | ICD-10-CM

## 2024-11-15 DIAGNOSIS — Z90.49 ACQUIRED ABSENCE OF OTHER SPECIFIED PARTS OF DIGESTIVE TRACT: Chronic | ICD-10-CM

## 2024-11-15 LAB
INR PPP: 1.7 RATIO
POCT-PROTHROMBIN TIME: 20.1 SECS
QUALITY CONTROL: YES

## 2024-11-15 PROCEDURE — 85610 PROTHROMBIN TIME: CPT

## 2024-11-15 PROCEDURE — 36416 COLLJ CAPILLARY BLOOD SPEC: CPT

## 2024-11-15 PROCEDURE — 99211 OFF/OP EST MAY X REQ PHY/QHP: CPT

## 2024-12-06 ENCOUNTER — APPOINTMENT (OUTPATIENT)
Dept: MEDICATION MANAGEMENT | Facility: CLINIC | Age: 79
End: 2024-12-06

## 2024-12-06 ENCOUNTER — OUTPATIENT (OUTPATIENT)
Dept: OUTPATIENT SERVICES | Facility: HOSPITAL | Age: 79
LOS: 1 days | End: 2024-12-06
Payer: MEDICARE

## 2024-12-06 DIAGNOSIS — I48.91 UNSPECIFIED ATRIAL FIBRILLATION: ICD-10-CM

## 2024-12-06 DIAGNOSIS — Z79.01 LONG TERM (CURRENT) USE OF ANTICOAGULANTS: ICD-10-CM

## 2024-12-06 DIAGNOSIS — Z90.49 ACQUIRED ABSENCE OF OTHER SPECIFIED PARTS OF DIGESTIVE TRACT: Chronic | ICD-10-CM

## 2024-12-06 DIAGNOSIS — Z98.890 OTHER SPECIFIED POSTPROCEDURAL STATES: Chronic | ICD-10-CM

## 2024-12-06 LAB
INR PPP: 1.9 RATIO
POCT-PROTHROMBIN TIME: 23.4 SECS
QUALITY CONTROL: YES

## 2024-12-06 PROCEDURE — 85610 PROTHROMBIN TIME: CPT

## 2024-12-06 PROCEDURE — 36416 COLLJ CAPILLARY BLOOD SPEC: CPT

## 2024-12-06 PROCEDURE — 99211 OFF/OP EST MAY X REQ PHY/QHP: CPT

## 2024-12-12 ENCOUNTER — NON-APPOINTMENT (OUTPATIENT)
Age: 79
End: 2024-12-12

## 2024-12-12 ENCOUNTER — APPOINTMENT (OUTPATIENT)
Dept: CARDIOLOGY | Facility: CLINIC | Age: 79
End: 2024-12-12
Payer: MEDICARE

## 2024-12-12 VITALS
BODY MASS INDEX: 30.37 KG/M2 | HEIGHT: 66 IN | DIASTOLIC BLOOD PRESSURE: 70 MMHG | SYSTOLIC BLOOD PRESSURE: 118 MMHG | HEART RATE: 73 BPM | WEIGHT: 189 LBS

## 2024-12-12 DIAGNOSIS — I34.0 NONRHEUMATIC MITRAL (VALVE) INSUFFICIENCY: ICD-10-CM

## 2024-12-12 DIAGNOSIS — Z79.01 ENCOUNTER FOR THERAPEUTIC DRUG LVL MONITORING: ICD-10-CM

## 2024-12-12 DIAGNOSIS — I51.9 HEART DISEASE, UNSPECIFIED: ICD-10-CM

## 2024-12-12 DIAGNOSIS — I23.6 THROMBOSIS OF ATRIUM, AURICULAR APPENDAGE, AND VENTRICLE AS CURRENT COMPLICATIONS FOLLOWING ACUTE MYOCARDIAL INFARCTION: ICD-10-CM

## 2024-12-12 DIAGNOSIS — I70.90 UNSPECIFIED ATHEROSCLEROSIS: ICD-10-CM

## 2024-12-12 DIAGNOSIS — E78.5 HYPERLIPIDEMIA, UNSPECIFIED: ICD-10-CM

## 2024-12-12 DIAGNOSIS — I48.91 UNSPECIFIED ATRIAL FIBRILLATION: ICD-10-CM

## 2024-12-12 DIAGNOSIS — Z51.81 ENCOUNTER FOR THERAPEUTIC DRUG LVL MONITORING: ICD-10-CM

## 2024-12-12 PROCEDURE — 99214 OFFICE O/P EST MOD 30 MIN: CPT | Mod: 25

## 2024-12-12 PROCEDURE — 93000 ELECTROCARDIOGRAM COMPLETE: CPT

## 2025-01-03 ENCOUNTER — OUTPATIENT (OUTPATIENT)
Dept: OUTPATIENT SERVICES | Facility: HOSPITAL | Age: 80
LOS: 1 days | End: 2025-01-03
Payer: MEDICARE

## 2025-01-03 ENCOUNTER — APPOINTMENT (OUTPATIENT)
Dept: MEDICATION MANAGEMENT | Facility: CLINIC | Age: 80
End: 2025-01-03

## 2025-01-03 DIAGNOSIS — I48.91 UNSPECIFIED ATRIAL FIBRILLATION: ICD-10-CM

## 2025-01-03 DIAGNOSIS — Z79.01 LONG TERM (CURRENT) USE OF ANTICOAGULANTS: ICD-10-CM

## 2025-01-03 DIAGNOSIS — Z98.890 OTHER SPECIFIED POSTPROCEDURAL STATES: Chronic | ICD-10-CM

## 2025-01-03 LAB
INR PPP: 1.6 RATIO
POCT-PROTHROMBIN TIME: 19.4 SECS
QUALITY CONTROL: YES

## 2025-01-03 PROCEDURE — 99211 OFF/OP EST MAY X REQ PHY/QHP: CPT

## 2025-01-03 PROCEDURE — 85610 PROTHROMBIN TIME: CPT

## 2025-01-03 PROCEDURE — 36416 COLLJ CAPILLARY BLOOD SPEC: CPT

## 2025-01-17 ENCOUNTER — APPOINTMENT (OUTPATIENT)
Dept: MEDICATION MANAGEMENT | Facility: CLINIC | Age: 80
End: 2025-01-17

## 2025-01-17 ENCOUNTER — OUTPATIENT (OUTPATIENT)
Dept: OUTPATIENT SERVICES | Facility: HOSPITAL | Age: 80
LOS: 1 days | End: 2025-01-17
Payer: MEDICARE

## 2025-01-17 DIAGNOSIS — Z79.01 LONG TERM (CURRENT) USE OF ANTICOAGULANTS: ICD-10-CM

## 2025-01-17 DIAGNOSIS — Z90.49 ACQUIRED ABSENCE OF OTHER SPECIFIED PARTS OF DIGESTIVE TRACT: Chronic | ICD-10-CM

## 2025-01-17 DIAGNOSIS — I48.91 UNSPECIFIED ATRIAL FIBRILLATION: ICD-10-CM

## 2025-01-17 DIAGNOSIS — Z98.890 OTHER SPECIFIED POSTPROCEDURAL STATES: Chronic | ICD-10-CM

## 2025-01-17 LAB
INR PPP: 2.3 RATIO
POCT-PROTHROMBIN TIME: 27.4 SECS
QUALITY CONTROL: YES

## 2025-01-17 PROCEDURE — 36416 COLLJ CAPILLARY BLOOD SPEC: CPT

## 2025-01-17 PROCEDURE — 85610 PROTHROMBIN TIME: CPT

## 2025-01-17 PROCEDURE — 99211 OFF/OP EST MAY X REQ PHY/QHP: CPT

## 2025-02-03 ENCOUNTER — APPOINTMENT (OUTPATIENT)
Dept: NEPHROLOGY | Facility: CLINIC | Age: 80
End: 2025-02-03

## 2025-02-14 ENCOUNTER — APPOINTMENT (OUTPATIENT)
Dept: MEDICATION MANAGEMENT | Facility: CLINIC | Age: 80
End: 2025-02-14

## 2025-02-14 ENCOUNTER — OUTPATIENT (OUTPATIENT)
Dept: OUTPATIENT SERVICES | Facility: HOSPITAL | Age: 80
LOS: 1 days | End: 2025-02-14
Payer: MEDICARE

## 2025-02-14 DIAGNOSIS — I48.91 UNSPECIFIED ATRIAL FIBRILLATION: ICD-10-CM

## 2025-02-14 DIAGNOSIS — Z98.890 OTHER SPECIFIED POSTPROCEDURAL STATES: Chronic | ICD-10-CM

## 2025-02-14 DIAGNOSIS — Z79.01 LONG TERM (CURRENT) USE OF ANTICOAGULANTS: ICD-10-CM

## 2025-02-14 DIAGNOSIS — Z90.49 ACQUIRED ABSENCE OF OTHER SPECIFIED PARTS OF DIGESTIVE TRACT: Chronic | ICD-10-CM

## 2025-02-14 LAB
INR PPP: 2.9 RATIO
QUALITY CONTROL: YES

## 2025-02-14 PROCEDURE — 99211 OFF/OP EST MAY X REQ PHY/QHP: CPT

## 2025-02-14 PROCEDURE — 85610 PROTHROMBIN TIME: CPT

## 2025-02-14 PROCEDURE — 36416 COLLJ CAPILLARY BLOOD SPEC: CPT

## 2025-03-20 ENCOUNTER — OUTPATIENT (OUTPATIENT)
Dept: OUTPATIENT SERVICES | Facility: HOSPITAL | Age: 80
LOS: 1 days | End: 2025-03-20
Payer: MEDICARE

## 2025-03-20 ENCOUNTER — APPOINTMENT (OUTPATIENT)
Dept: MEDICATION MANAGEMENT | Facility: CLINIC | Age: 80
End: 2025-03-20

## 2025-03-20 DIAGNOSIS — I48.91 UNSPECIFIED ATRIAL FIBRILLATION: ICD-10-CM

## 2025-03-20 DIAGNOSIS — Z98.890 OTHER SPECIFIED POSTPROCEDURAL STATES: Chronic | ICD-10-CM

## 2025-03-20 DIAGNOSIS — Z79.01 LONG TERM (CURRENT) USE OF ANTICOAGULANTS: ICD-10-CM

## 2025-03-20 DIAGNOSIS — Z90.49 ACQUIRED ABSENCE OF OTHER SPECIFIED PARTS OF DIGESTIVE TRACT: Chronic | ICD-10-CM

## 2025-03-20 LAB
INR PPP: 3.4 RATIO
QUALITY CONTROL: YES

## 2025-03-20 PROCEDURE — 85610 PROTHROMBIN TIME: CPT

## 2025-03-20 PROCEDURE — 36416 COLLJ CAPILLARY BLOOD SPEC: CPT

## 2025-03-20 PROCEDURE — 99211 OFF/OP EST MAY X REQ PHY/QHP: CPT

## 2025-04-18 ENCOUNTER — APPOINTMENT (OUTPATIENT)
Dept: MEDICATION MANAGEMENT | Facility: CLINIC | Age: 80
End: 2025-04-18

## 2025-04-18 ENCOUNTER — OUTPATIENT (OUTPATIENT)
Dept: OUTPATIENT SERVICES | Facility: HOSPITAL | Age: 80
LOS: 1 days | End: 2025-04-18
Payer: MEDICARE

## 2025-04-18 DIAGNOSIS — Z79.01 LONG TERM (CURRENT) USE OF ANTICOAGULANTS: ICD-10-CM

## 2025-04-18 DIAGNOSIS — Z98.890 OTHER SPECIFIED POSTPROCEDURAL STATES: Chronic | ICD-10-CM

## 2025-04-18 DIAGNOSIS — Z90.49 ACQUIRED ABSENCE OF OTHER SPECIFIED PARTS OF DIGESTIVE TRACT: Chronic | ICD-10-CM

## 2025-04-18 DIAGNOSIS — I48.91 UNSPECIFIED ATRIAL FIBRILLATION: ICD-10-CM

## 2025-04-18 LAB
INR PPP: 2 RATIO
QUALITY CONTROL: YES

## 2025-04-18 PROCEDURE — 85610 PROTHROMBIN TIME: CPT

## 2025-04-18 PROCEDURE — 36416 COLLJ CAPILLARY BLOOD SPEC: CPT

## 2025-04-18 PROCEDURE — 99211 OFF/OP EST MAY X REQ PHY/QHP: CPT

## 2025-05-21 ENCOUNTER — OUTPATIENT (OUTPATIENT)
Dept: OUTPATIENT SERVICES | Facility: HOSPITAL | Age: 80
LOS: 1 days | End: 2025-05-21
Payer: MEDICARE

## 2025-05-21 ENCOUNTER — APPOINTMENT (OUTPATIENT)
Dept: MEDICATION MANAGEMENT | Facility: CLINIC | Age: 80
End: 2025-05-21

## 2025-05-21 DIAGNOSIS — I48.91 UNSPECIFIED ATRIAL FIBRILLATION: ICD-10-CM

## 2025-05-21 DIAGNOSIS — Z98.890 OTHER SPECIFIED POSTPROCEDURAL STATES: Chronic | ICD-10-CM

## 2025-05-21 DIAGNOSIS — Z79.01 LONG TERM (CURRENT) USE OF ANTICOAGULANTS: ICD-10-CM

## 2025-05-21 DIAGNOSIS — Z90.49 ACQUIRED ABSENCE OF OTHER SPECIFIED PARTS OF DIGESTIVE TRACT: Chronic | ICD-10-CM

## 2025-05-21 LAB
INR PPP: 1.7 RATIO
POCT-PROTHROMBIN TIME: 20.4 SECS
QUALITY CONTROL: YES

## 2025-05-21 PROCEDURE — 36416 COLLJ CAPILLARY BLOOD SPEC: CPT

## 2025-05-21 PROCEDURE — 85610 PROTHROMBIN TIME: CPT

## 2025-05-21 PROCEDURE — 99211 OFF/OP EST MAY X REQ PHY/QHP: CPT

## 2025-06-02 ENCOUNTER — APPOINTMENT (OUTPATIENT)
Dept: CARDIOLOGY | Facility: CLINIC | Age: 80
End: 2025-06-02
Payer: MEDICARE

## 2025-06-02 PROCEDURE — 93306 TTE W/DOPPLER COMPLETE: CPT

## 2025-06-11 ENCOUNTER — APPOINTMENT (OUTPATIENT)
Dept: MEDICATION MANAGEMENT | Facility: CLINIC | Age: 80
End: 2025-06-11

## 2025-06-11 ENCOUNTER — OUTPATIENT (OUTPATIENT)
Dept: OUTPATIENT SERVICES | Facility: HOSPITAL | Age: 80
LOS: 1 days | End: 2025-06-11
Payer: MEDICARE

## 2025-06-11 DIAGNOSIS — I48.91 UNSPECIFIED ATRIAL FIBRILLATION: ICD-10-CM

## 2025-06-11 DIAGNOSIS — Z79.01 LONG TERM (CURRENT) USE OF ANTICOAGULANTS: ICD-10-CM

## 2025-06-11 LAB
INR PPP: 1.5 RATIO
POCT-PROTHROMBIN TIME: 17.7 SECS
QUALITY CONTROL: YES

## 2025-06-11 PROCEDURE — 99211 OFF/OP EST MAY X REQ PHY/QHP: CPT

## 2025-06-11 PROCEDURE — 85610 PROTHROMBIN TIME: CPT

## 2025-06-11 PROCEDURE — 36416 COLLJ CAPILLARY BLOOD SPEC: CPT

## 2025-06-19 ENCOUNTER — APPOINTMENT (OUTPATIENT)
Dept: NEPHROLOGY | Facility: CLINIC | Age: 80
End: 2025-06-19
Payer: MEDICARE

## 2025-06-19 VITALS
WEIGHT: 174 LBS | OXYGEN SATURATION: 97 % | HEART RATE: 86 BPM | BODY MASS INDEX: 27.97 KG/M2 | DIASTOLIC BLOOD PRESSURE: 64 MMHG | SYSTOLIC BLOOD PRESSURE: 102 MMHG | HEIGHT: 66 IN

## 2025-06-19 PROBLEM — R31.29 MICROSCOPIC HEMATURIA: Status: ACTIVE | Noted: 2025-06-19

## 2025-06-19 LAB
BILIRUB UR QL STRIP: NORMAL
CLARITY UR: CLEAR
COLLECTION METHOD: NORMAL
GLUCOSE UR-MCNC: NORMAL
HCG UR QL: 0.2 EU/DL
HGB UR QL STRIP.AUTO: NORMAL
KETONES UR-MCNC: NORMAL
LEUKOCYTE ESTERASE UR QL STRIP: NORMAL
NITRITE UR QL STRIP: NORMAL
PH UR STRIP: 7
PROT UR STRIP-MCNC: NORMAL
SP GR UR STRIP: 1.01

## 2025-06-19 PROCEDURE — G2211 COMPLEX E/M VISIT ADD ON: CPT

## 2025-06-19 PROCEDURE — 81002 URINALYSIS NONAUTO W/O SCOPE: CPT

## 2025-06-19 PROCEDURE — 99204 OFFICE O/P NEW MOD 45 MIN: CPT

## 2025-06-25 ENCOUNTER — APPOINTMENT (OUTPATIENT)
Dept: MEDICATION MANAGEMENT | Facility: CLINIC | Age: 80
End: 2025-06-25

## 2025-06-25 ENCOUNTER — OUTPATIENT (OUTPATIENT)
Dept: OUTPATIENT SERVICES | Facility: HOSPITAL | Age: 80
LOS: 1 days | End: 2025-06-25
Payer: MEDICARE

## 2025-06-25 DIAGNOSIS — Z79.01 LONG TERM (CURRENT) USE OF ANTICOAGULANTS: ICD-10-CM

## 2025-06-25 DIAGNOSIS — I48.91 UNSPECIFIED ATRIAL FIBRILLATION: ICD-10-CM

## 2025-06-25 DIAGNOSIS — Z98.890 OTHER SPECIFIED POSTPROCEDURAL STATES: Chronic | ICD-10-CM

## 2025-06-25 DIAGNOSIS — Z90.49 ACQUIRED ABSENCE OF OTHER SPECIFIED PARTS OF DIGESTIVE TRACT: Chronic | ICD-10-CM

## 2025-06-25 LAB
INR PPP: 2 RATIO
POCT-PROTHROMBIN TIME: 23.8 SECS
QUALITY CONTROL: YES

## 2025-06-25 PROCEDURE — 99211 OFF/OP EST MAY X REQ PHY/QHP: CPT

## 2025-06-25 PROCEDURE — 85610 PROTHROMBIN TIME: CPT

## 2025-06-25 PROCEDURE — 36416 COLLJ CAPILLARY BLOOD SPEC: CPT

## 2025-06-26 ENCOUNTER — APPOINTMENT (OUTPATIENT)
Dept: CARDIOLOGY | Facility: CLINIC | Age: 80
End: 2025-06-26
Payer: MEDICARE

## 2025-06-26 VITALS
HEART RATE: 68 BPM | SYSTOLIC BLOOD PRESSURE: 112 MMHG | WEIGHT: 177 LBS | HEIGHT: 66 IN | DIASTOLIC BLOOD PRESSURE: 70 MMHG | BODY MASS INDEX: 28.45 KG/M2

## 2025-06-26 PROCEDURE — 99214 OFFICE O/P EST MOD 30 MIN: CPT | Mod: 25

## 2025-06-26 PROCEDURE — 93000 ELECTROCARDIOGRAM COMPLETE: CPT

## 2025-06-26 RX ORDER — CLOBETASOL PROPIONATE 0.05 G/ML
SPRAY TOPICAL
Refills: 0 | Status: ACTIVE | COMMUNITY

## 2025-06-26 RX ORDER — UBIDECARENONE 200 MG
CAPSULE ORAL
Refills: 0 | Status: ACTIVE | COMMUNITY

## 2025-07-08 ENCOUNTER — OUTPATIENT (OUTPATIENT)
Dept: OUTPATIENT SERVICES | Facility: HOSPITAL | Age: 80
LOS: 1 days | End: 2025-07-08
Payer: MEDICARE

## 2025-07-08 DIAGNOSIS — Z98.890 OTHER SPECIFIED POSTPROCEDURAL STATES: Chronic | ICD-10-CM

## 2025-07-08 DIAGNOSIS — Z90.49 ACQUIRED ABSENCE OF OTHER SPECIFIED PARTS OF DIGESTIVE TRACT: Chronic | ICD-10-CM

## 2025-07-08 DIAGNOSIS — R31.29 OTHER MICROSCOPIC HEMATURIA: ICD-10-CM

## 2025-07-08 PROCEDURE — 76770 US EXAM ABDO BACK WALL COMP: CPT | Mod: 26

## 2025-07-08 PROCEDURE — 76770 US EXAM ABDO BACK WALL COMP: CPT

## 2025-07-09 ENCOUNTER — LABORATORY RESULT (OUTPATIENT)
Age: 80
End: 2025-07-09

## 2025-07-09 DIAGNOSIS — R31.29 OTHER MICROSCOPIC HEMATURIA: ICD-10-CM

## 2025-07-10 ENCOUNTER — LABORATORY RESULT (OUTPATIENT)
Age: 80
End: 2025-07-10

## 2025-07-23 ENCOUNTER — OUTPATIENT (OUTPATIENT)
Dept: OUTPATIENT SERVICES | Facility: HOSPITAL | Age: 80
LOS: 1 days | End: 2025-07-23
Payer: MEDICARE

## 2025-07-23 ENCOUNTER — APPOINTMENT (OUTPATIENT)
Dept: MEDICATION MANAGEMENT | Facility: CLINIC | Age: 80
End: 2025-07-23

## 2025-07-23 DIAGNOSIS — Z98.890 OTHER SPECIFIED POSTPROCEDURAL STATES: Chronic | ICD-10-CM

## 2025-07-23 DIAGNOSIS — I48.91 UNSPECIFIED ATRIAL FIBRILLATION: ICD-10-CM

## 2025-07-23 DIAGNOSIS — Z90.49 ACQUIRED ABSENCE OF OTHER SPECIFIED PARTS OF DIGESTIVE TRACT: Chronic | ICD-10-CM

## 2025-07-23 DIAGNOSIS — Z79.01 LONG TERM (CURRENT) USE OF ANTICOAGULANTS: ICD-10-CM

## 2025-07-23 PROCEDURE — 85610 PROTHROMBIN TIME: CPT

## 2025-07-23 PROCEDURE — 36416 COLLJ CAPILLARY BLOOD SPEC: CPT

## 2025-07-23 PROCEDURE — 99211 OFF/OP EST MAY X REQ PHY/QHP: CPT

## 2025-07-25 LAB
INR PPP: 2.4 RATIO
POCT-PROTHROMBIN TIME: 28 SECS
QUALITY CONTROL: YES

## 2025-07-30 ENCOUNTER — APPOINTMENT (OUTPATIENT)
Dept: NEPHROLOGY | Facility: CLINIC | Age: 80
End: 2025-07-30
Payer: MEDICARE

## 2025-07-30 VITALS
HEIGHT: 66 IN | BODY MASS INDEX: 27.64 KG/M2 | SYSTOLIC BLOOD PRESSURE: 110 MMHG | OXYGEN SATURATION: 98 % | HEART RATE: 97 BPM | WEIGHT: 172 LBS | DIASTOLIC BLOOD PRESSURE: 70 MMHG

## 2025-07-30 DIAGNOSIS — N40.1 BENIGN PROSTATIC HYPERPLASIA WITH LOWER URINARY TRACT SYMPMS: ICD-10-CM

## 2025-07-30 DIAGNOSIS — R35.0 BENIGN PROSTATIC HYPERPLASIA WITH LOWER URINARY TRACT SYMPMS: ICD-10-CM

## 2025-07-30 DIAGNOSIS — N20.0 CALCULUS OF KIDNEY: ICD-10-CM

## 2025-07-30 DIAGNOSIS — N18.32 CHRONIC KIDNEY DISEASE, STAGE 3B: ICD-10-CM

## 2025-07-30 DIAGNOSIS — R80.1 PERSISTENT PROTEINURIA, UNSPECIFIED: ICD-10-CM

## 2025-07-30 PROCEDURE — 99214 OFFICE O/P EST MOD 30 MIN: CPT

## 2025-07-30 PROCEDURE — G2211 COMPLEX E/M VISIT ADD ON: CPT

## 2025-07-30 RX ORDER — TAMSULOSIN HYDROCHLORIDE 0.4 MG/1
0.4 CAPSULE ORAL
Qty: 90 | Refills: 1 | Status: ACTIVE | COMMUNITY
Start: 2025-07-30 | End: 1900-01-01

## 2025-07-30 RX ORDER — LISINOPRIL 10 MG/1
10 TABLET ORAL
Qty: 90 | Refills: 1 | Status: ACTIVE | COMMUNITY
Start: 2025-07-30 | End: 1900-01-01

## 2025-08-20 ENCOUNTER — OUTPATIENT (OUTPATIENT)
Dept: OUTPATIENT SERVICES | Facility: HOSPITAL | Age: 80
LOS: 1 days | End: 2025-08-20
Payer: MEDICARE

## 2025-08-20 ENCOUNTER — APPOINTMENT (OUTPATIENT)
Dept: MEDICATION MANAGEMENT | Facility: CLINIC | Age: 80
End: 2025-08-20

## 2025-08-20 DIAGNOSIS — Z79.01 LONG TERM (CURRENT) USE OF ANTICOAGULANTS: ICD-10-CM

## 2025-08-20 DIAGNOSIS — Z90.49 ACQUIRED ABSENCE OF OTHER SPECIFIED PARTS OF DIGESTIVE TRACT: Chronic | ICD-10-CM

## 2025-08-20 DIAGNOSIS — I48.91 UNSPECIFIED ATRIAL FIBRILLATION: ICD-10-CM

## 2025-08-20 DIAGNOSIS — Z98.890 OTHER SPECIFIED POSTPROCEDURAL STATES: Chronic | ICD-10-CM

## 2025-08-20 LAB
INR PPP: 2.3 RATIO
POCT-PROTHROMBIN TIME: 27.7 SECS
QUALITY CONTROL: YES

## 2025-08-20 PROCEDURE — 36416 COLLJ CAPILLARY BLOOD SPEC: CPT

## 2025-08-20 PROCEDURE — 99212 OFFICE O/P EST SF 10 MIN: CPT

## 2025-08-20 PROCEDURE — 85610 PROTHROMBIN TIME: CPT

## 2025-09-11 ENCOUNTER — TRANSCRIPTION ENCOUNTER (OUTPATIENT)
Age: 80
End: 2025-09-11

## 2025-09-16 ENCOUNTER — APPOINTMENT (OUTPATIENT)
Dept: GASTROENTEROLOGY | Facility: CLINIC | Age: 80
End: 2025-09-16
Payer: MEDICARE

## 2025-09-16 VITALS — WEIGHT: 167 LBS | BODY MASS INDEX: 26.84 KG/M2 | HEIGHT: 66 IN

## 2025-09-16 DIAGNOSIS — D69.6 THROMBOCYTOPENIA, UNSPECIFIED: ICD-10-CM

## 2025-09-16 DIAGNOSIS — Z12.11 ENCOUNTER FOR SCREENING FOR MALIGNANT NEOPLASM OF COLON: ICD-10-CM

## 2025-09-16 DIAGNOSIS — R74.8 ABNORMAL LEVELS OF OTHER SERUM ENZYMES: ICD-10-CM

## 2025-09-16 DIAGNOSIS — R63.4 ABNORMAL WEIGHT LOSS: ICD-10-CM

## 2025-09-16 DIAGNOSIS — Z87.891 PERSONAL HISTORY OF NICOTINE DEPENDENCE: ICD-10-CM

## 2025-09-16 DIAGNOSIS — F17.290 NICOTINE DEPENDENCE, OTHER TOBACCO PRODUCT, UNCOMPLICATED: ICD-10-CM

## 2025-09-16 PROCEDURE — 99204 OFFICE O/P NEW MOD 45 MIN: CPT

## 2025-09-16 RX ORDER — PEG-3350, SODIUM SULFATE, SODIUM CHLORIDE, POTASSIUM CHLORIDE, SODIUM ASCORBATE AND ASCORBIC ACID 7.5-2.691G
100 KIT ORAL
Qty: 1 | Refills: 0 | Status: ACTIVE | COMMUNITY
Start: 2025-09-16 | End: 1900-01-01

## 2025-09-23 LAB
HCT VFR BLD CALC: 42.5 %
HGB BLD-MCNC: 13.2 G/DL
MCHC RBC-ENTMCNC: 28.8 PG
MCHC RBC-ENTMCNC: 31.1 G/DL
MCV RBC AUTO: 92.8 FL
PLATELET # BLD AUTO: 216 K/UL
PMV BLD AUTO: 0 /100 WBCS
PMV BLD: 11.2 FL
RBC # BLD: 4.58 M/UL
RBC # FLD: 16.1 %
WBC # FLD AUTO: 9.85 K/UL

## 2025-09-24 LAB
ALBUMIN SERPL ELPH-MCNC: 4.2 G/DL
ALP BLD-CCNC: 33 U/L
ALT SERPL-CCNC: 13 U/L
ANION GAP SERPL CALC-SCNC: 12 MMOL/L
AST SERPL-CCNC: 16 U/L
BILIRUB SERPL-MCNC: 0.5 MG/DL
BUN SERPL-MCNC: 23 MG/DL
CALCIUM SERPL-MCNC: 9.4 MG/DL
CHLORIDE SERPL-SCNC: 97 MMOL/L
CO2 SERPL-SCNC: 26 MMOL/L
CREAT SERPL-MCNC: 1.3 MG/DL
EGFRCR SERPLBLD CKD-EPI 2021: 56 ML/MIN/1.73M2
GLUCOSE SERPL-MCNC: 128 MG/DL
POTASSIUM SERPL-SCNC: 5 MMOL/L
PROT SERPL-MCNC: 6.7 G/DL
SODIUM SERPL-SCNC: 135 MMOL/L